# Patient Record
Sex: MALE | Race: WHITE | NOT HISPANIC OR LATINO
[De-identification: names, ages, dates, MRNs, and addresses within clinical notes are randomized per-mention and may not be internally consistent; named-entity substitution may affect disease eponyms.]

---

## 2020-02-18 ENCOUNTER — NON-APPOINTMENT (OUTPATIENT)
Age: 60
End: 2020-02-18

## 2020-02-18 ENCOUNTER — APPOINTMENT (OUTPATIENT)
Dept: CARDIOLOGY | Facility: CLINIC | Age: 60
End: 2020-02-18
Payer: COMMERCIAL

## 2020-02-18 VITALS
WEIGHT: 195 LBS | HEIGHT: 67 IN | HEART RATE: 126 BPM | OXYGEN SATURATION: 94 % | DIASTOLIC BLOOD PRESSURE: 90 MMHG | BODY MASS INDEX: 30.61 KG/M2 | SYSTOLIC BLOOD PRESSURE: 130 MMHG

## 2020-02-18 DIAGNOSIS — Z86.39 PERSONAL HISTORY OF OTHER ENDOCRINE, NUTRITIONAL AND METABOLIC DISEASE: ICD-10-CM

## 2020-02-18 DIAGNOSIS — Z87.898 PERSONAL HISTORY OF OTHER SPECIFIED CONDITIONS: ICD-10-CM

## 2020-02-18 DIAGNOSIS — Z80.3 FAMILY HISTORY OF MALIGNANT NEOPLASM OF BREAST: ICD-10-CM

## 2020-02-18 DIAGNOSIS — Z80.8 FAMILY HISTORY OF MALIGNANT NEOPLASM OF OTHER ORGANS OR SYSTEMS: ICD-10-CM

## 2020-02-18 DIAGNOSIS — Z82.49 FAMILY HISTORY OF ISCHEMIC HEART DISEASE AND OTHER DISEASES OF THE CIRCULATORY SYSTEM: ICD-10-CM

## 2020-02-18 DIAGNOSIS — Z78.9 OTHER SPECIFIED HEALTH STATUS: ICD-10-CM

## 2020-02-18 PROCEDURE — 93000 ELECTROCARDIOGRAM COMPLETE: CPT

## 2020-02-18 PROCEDURE — 0296T: CPT

## 2020-02-18 PROCEDURE — 99244 OFF/OP CNSLTJ NEW/EST MOD 40: CPT | Mod: 25

## 2020-02-18 NOTE — PHYSICAL EXAM
[Normal Appearance] : normal appearance [General Appearance - Well Developed] : well developed [General Appearance - Well Nourished] : well nourished [Well Groomed] : well groomed [General Appearance - In No Acute Distress] : no acute distress [Normal Conjunctiva] : the conjunctiva exhibited no abnormalities [No Deformities] : no deformities [Eyelids - No Xanthelasma] : the eyelids demonstrated no xanthelasmas [Normal Oral Mucosa] : normal oral mucosa [No Oral Pallor] : no oral pallor [Normal] : normal [No Oral Cyanosis] : no oral cyanosis [Tachycardia] : tachycardic [No Precordial Heave] : no precordial heave was noted [Normal S1] : normal S1 [Rhythm Regular] : regular [Normal S2] : normal S2 [No Gallop] : no gallop heard [S3] : no S3 [No Murmur] : no murmurs heard [S4] : no S4 [Right Carotid Bruit] : no bruit heard over the right carotid [Left Carotid Bruit] : no bruit heard over the left carotid [Right Femoral Bruit] : no bruit heard over the right femoral artery [Left Femoral Bruit] : no bruit heard over the left femoral artery [No Abnormalities] : the abdominal aorta was not enlarged and no bruit was heard [No Pitting Edema] : no pitting edema present [2+] : left 2+ [Exaggerated Use Of Accessory Muscles For Inspiration] : no accessory muscle use [Respiration, Rhythm And Depth] : normal respiratory rhythm and effort [Abdomen Soft] : soft [Auscultation Breath Sounds / Voice Sounds] : lungs were clear to auscultation bilaterally [Abdomen Tenderness] : non-tender [Abnormal Walk] : normal gait [Abdomen Mass (___ Cm)] : no abdominal mass palpated [Cyanosis, Localized] : no localized cyanosis [Nail Clubbing] : no clubbing of the fingernails [Gait - Sufficient For Exercise Testing] : the gait was sufficient for exercise testing [Petechial Hemorrhages (___cm)] : no petechial hemorrhages [No Venous Stasis] : no venous stasis [Skin Color & Pigmentation] : normal skin color and pigmentation [] : no rash [No Xanthoma] : no  xanthoma was observed [No Skin Ulcers] : no skin ulcer [Skin Lesions] : no skin lesions [Oriented To Time, Place, And Person] : oriented to person, place, and time [Affect] : the affect was normal [No Anxiety] : not feeling anxious [Mood] : the mood was normal

## 2020-02-18 NOTE — ASSESSMENT
[FreeTextEntry1] : Reviewed on February 18, 2020\par EKG.  February 18, 2020 per indication tachycardia per interpretation.  Sinus tachycardia.  Biatrial enlargement.  Normal intervals.\par Labs 2016 had shown stable CBC CMP.  Most recent labs not available.  He is planning to have more labs done tomorrow.\par Specifically in relation to thyroid abnormality.

## 2020-02-18 NOTE — REASON FOR VISIT
[Consultation] : a consultation regarding [Abnormal ECG] : an abnormal ECG [Palpitations] : palpitations [FreeTextEntry1] : 59-year-old gentleman comes in for consultation referred by primary care physician and endocrinologist in relation to\par Sinus tachycardia intermittently since 1997 now more pronounced and remaining greater than 110 on multiple occasions.  He has noticed intermittent palpitations.  Without any associated chest pain PND orthopnea pedal edema.  He has no dizziness lightheadedness or syncope.  When he has used his wife's apple watch he has noticed heart rate at night is around 80.  Though during the daytime it can be as high as 120 or higher.\par Thyroid disorder initially hypothyroidism on levothyroxine subsequently hypothyroidism now on methimazole with intermittent changes being followed with endocrinologist closely.\par Renal stone disease\par No significant history of hypertension, diabetes mellitus, myocardial infarction, coronary artery disease, cerebrovascular accident, peripheral arterial disease, rheumatic fever.  He has no history of syncope.\par Has stable weight\par Good activity level though limited by arthritis of shoulder and bilateral knee\par No significant increase in alcohol or caffeine intake.  1-2 drinks of caffeinated beverage a day.  1 drink of alcohol intermittently.\par No significant sleep disturbances or diagnosis of sleep apnea though has been noted to snore according to his wife\par Denies any over-the-counter stimulants to use\par

## 2020-02-18 NOTE — REVIEW OF SYSTEMS
[see HPI] : see HPI [Shortness Of Breath] : no shortness of breath [Chest  Pressure] : no chest pressure [Dyspnea on exertion] : not dyspnea during exertion [Chest Pain] : no chest pain [Lower Ext Edema] : no extremity edema [Leg Claudication] : no intermittent leg claudication [Palpitations] : palpitations [Negative] : Heme/Lymph

## 2020-02-18 NOTE — DISCUSSION/SUMMARY
[FreeTextEntry1] : 59-year-old gentleman with thyroid disorder at present being treated for hypothyroidism comes in with intermittent palpitation, fast heartbeats and EKG showing sinus tachycardia with biatrial enlargement.\par Clinically no signs of congestive heart failure.  He does have good activity and exercise program limited by osteoarthritis.\par He has stable weight\par I do not have most recent thyroid and other labs available.\par I do not have any lipid panel available.\par His blood pressure today was borderline.\par \par At present to rule out any significant structural heart disease and presence of longstanding history of sinus tachycardia/thyroid disorder I have recommended him\par Echocardiogram for LV ejection fraction wall motion wall thickness pericardial space evaluation\par Exercise treadmill stress test to assess evaluate his exercise tolerance and blood pressure heart rate response with exercise\par I have also given him 24-hour cardiac monitoring to assess evaluate his diurnal variation of ventricular rate.\par He will also have fasting lipid panel along with other labs which are to be done with endocrinologist office specifically involving CBC CMP thyroid panel.\par Recommended to avoid stimulants.  Lower level of alcohol and caffeine intake\par \par Based on above test will discuss further whether he would benefit from any other medical evaluation management and or medications like beta-blocker use.\par \par He does have erectile dysfunction and was prescribed Cialis.  At present I have recommended him to not use until cardiac evaluation is complete.  With vasodilator property it can increase ventricular rate further.\par If there is no structural heart disease consider shorter acting medications like Levitra or sildenafil.\par \par Counseling regarding low saturated fat, salt and carbohydrate intake was reviewed. Active lifestyle and regular. Exercise along with weight management is advised.\par All the above were at length reviewed. Answered all the questions. Thank you very much for this kind referral. Please do not hesitate to give me a call for any question.\par Part of this transcription was done with voice recognition software and phonetically similar errors are common. I apologize for that. Please do not hesitate to call for any questions due to above.\par

## 2020-02-19 ENCOUNTER — TRANSCRIPTION ENCOUNTER (OUTPATIENT)
Age: 60
End: 2020-02-19

## 2020-02-26 PROCEDURE — 0298T: CPT

## 2020-02-27 ENCOUNTER — APPOINTMENT (OUTPATIENT)
Dept: CARDIOLOGY | Facility: CLINIC | Age: 60
End: 2020-02-27
Payer: COMMERCIAL

## 2020-02-27 PROCEDURE — 99214 OFFICE O/P EST MOD 30 MIN: CPT

## 2020-02-27 PROCEDURE — 93306 TTE W/DOPPLER COMPLETE: CPT

## 2020-02-27 NOTE — DISCUSSION/SUMMARY
[FreeTextEntry1] : FIOR SALINAS  is a 59 year M  who presents today Feb 27, 2020 with the above history and the following active issues. \par \par Cardiomyopathy with EF 35%. Recommend starting Toprol 25mg and Valsartan 80mg QD. Lab work including CBC, CMP, RF, serum light chain, SRIKANTH, CRP, Lyme titer, ACE level, TSH. \par Ischemic evaluation with left heart cath. Risks, benefits and alternatives reviewed. \par \par Thyroid disorder followed by endocrinology.\par \par Sinus tachycardia. Start Toprol 25mg QD. Recommended to avoid stimulants.  Lower level of alcohol and caffeine intake. Blood work to be obtained today. \par \par Red flag symptoms which would warrant sooner emergent evaluation reviewed with the patient. \par Questions and concerns were addressed and answered. \par \par Sincerely,\par \par Kimberly Moore PA-C\par Patients history, testing and plan reviewed with supervising MD: Dr. Patrick Valentino \par Reviewed over the phone with Dr. Oconnell

## 2020-02-27 NOTE — REVIEW OF SYSTEMS
[see HPI] : see HPI [Negative] : Heme/Lymph [Shortness Of Breath] : no shortness of breath [Chest  Pressure] : no chest pressure [Dyspnea on exertion] : not dyspnea during exertion [Lower Ext Edema] : no extremity edema [Chest Pain] : no chest pain [Palpitations] : no palpitations [Leg Claudication] : no intermittent leg claudication

## 2020-02-27 NOTE — PHYSICAL EXAM
[General Appearance - Well Developed] : well developed [Well Groomed] : well groomed [Normal Appearance] : normal appearance [General Appearance - Well Nourished] : well nourished [No Deformities] : no deformities [General Appearance - In No Acute Distress] : no acute distress [Normal Conjunctiva] : the conjunctiva exhibited no abnormalities [Eyelids - No Xanthelasma] : the eyelids demonstrated no xanthelasmas [Normal Oral Mucosa] : normal oral mucosa [No Oral Pallor] : no oral pallor [No Oral Cyanosis] : no oral cyanosis [Exaggerated Use Of Accessory Muscles For Inspiration] : no accessory muscle use [Respiration, Rhythm And Depth] : normal respiratory rhythm and effort [Auscultation Breath Sounds / Voice Sounds] : lungs were clear to auscultation bilaterally [Abdomen Soft] : soft [Abdomen Tenderness] : non-tender [Abdomen Mass (___ Cm)] : no abdominal mass palpated [Abnormal Walk] : normal gait [Gait - Sufficient For Exercise Testing] : the gait was sufficient for exercise testing [Nail Clubbing] : no clubbing of the fingernails [Petechial Hemorrhages (___cm)] : no petechial hemorrhages [Cyanosis, Localized] : no localized cyanosis [Skin Color & Pigmentation] : normal skin color and pigmentation [] : no rash [No Venous Stasis] : no venous stasis [Skin Lesions] : no skin lesions [No Xanthoma] : no  xanthoma was observed [No Skin Ulcers] : no skin ulcer [Oriented To Time, Place, And Person] : oriented to person, place, and time [Affect] : the affect was normal [Mood] : the mood was normal [No Anxiety] : not feeling anxious [Normal] : normal [No Precordial Heave] : no precordial heave was noted [Tachycardia] : tachycardic [Normal S1] : normal S1 [Rhythm Regular] : regular [Normal S2] : normal S2 [No Gallop] : no gallop heard [No Murmur] : no murmurs heard [2+] : right 2+ [No Pitting Edema] : no pitting edema present [No Abnormalities] : the abdominal aorta was not enlarged and no bruit was heard [S4] : no S4 [S3] : no S3 [Right Femoral Bruit] : no bruit heard over the right femoral artery [Right Carotid Bruit] : no bruit heard over the right carotid [Left Femoral Bruit] : no bruit heard over the left femoral artery [Left Carotid Bruit] : no bruit heard over the left carotid

## 2020-02-27 NOTE — REASON FOR VISIT
[Consultation] : a consultation regarding [Abnormal ECG] : an abnormal ECG [Palpitations] : palpitations [FreeTextEntry1] : FIOR SALINAS  is a 59 year M  who presents today Feb 27, 2020 for echocardiogram and ETT. Echo demonstrated EF 35%. Patient has history of sinus tachycardia on HM average HR 110bpm. He is active on a regular basis with no new exertional complaints. \par \par Initially referred by primary care physician and endocrinologist in relation to sinus tachycardia intermittently since 1997 now more pronounced and remaining greater than 110 on multiple occasions.  \par \par Thyroid disorder initially hypothyroidism on levothyroxine subsequently hypothyroidism now on methimazole with intermittent changes being followed with endocrinologist closely.\par Renal stone disease\par No significant history of hypertension, diabetes mellitus, myocardial infarction, coronary artery disease, cerebrovascular accident, peripheral arterial disease, rheumatic fever.  He has no history of syncope.\par Has stable weight\par Good activity level though limited by arthritis of shoulder and bilateral knee\par No significant increase in alcohol or caffeine intake.  1-2 drinks of caffeinated beverage a day.  1 drink of alcohol intermittently.\par No significant sleep disturbances or diagnosis of sleep apnea though has been noted to snore according to his wife\par Denies any over-the-counter stimulants to use\par

## 2020-02-27 NOTE — ASSESSMENT
[FreeTextEntry1] : Reviewed on February 18, 2020\par EKG.  February 18, 2020 per indication tachycardia per interpretation.  Sinus tachycardia.  Biatrial enlargement.  Normal intervals.\par

## 2020-02-28 ENCOUNTER — OUTPATIENT (OUTPATIENT)
Dept: OUTPATIENT SERVICES | Facility: HOSPITAL | Age: 60
LOS: 1 days | End: 2020-02-28
Payer: COMMERCIAL

## 2020-02-28 PROCEDURE — 99205 OFFICE O/P NEW HI 60 MIN: CPT | Mod: 25

## 2020-02-28 PROCEDURE — 93460 R&L HRT ART/VENTRICLE ANGIO: CPT | Mod: 26

## 2020-02-28 PROCEDURE — 99214 OFFICE O/P EST MOD 30 MIN: CPT | Mod: 25

## 2020-03-02 ENCOUNTER — APPOINTMENT (OUTPATIENT)
Dept: CARDIOLOGY | Facility: CLINIC | Age: 60
End: 2020-03-02
Payer: COMMERCIAL

## 2020-03-02 VITALS
SYSTOLIC BLOOD PRESSURE: 118 MMHG | HEIGHT: 67 IN | OXYGEN SATURATION: 93 % | BODY MASS INDEX: 29.82 KG/M2 | WEIGHT: 190 LBS | DIASTOLIC BLOOD PRESSURE: 72 MMHG | HEART RATE: 98 BPM

## 2020-03-02 PROCEDURE — 99215 OFFICE O/P EST HI 40 MIN: CPT

## 2020-03-02 RX ORDER — METOPROLOL SUCCINATE 25 MG/1
25 TABLET, EXTENDED RELEASE ORAL DAILY
Qty: 90 | Refills: 3 | Status: DISCONTINUED | COMMUNITY
Start: 2020-02-27 | End: 2020-03-02

## 2020-03-02 NOTE — PHYSICAL EXAM
[General Appearance - Well Developed] : well developed [Normal Appearance] : normal appearance [General Appearance - Well Nourished] : well nourished [Well Groomed] : well groomed [General Appearance - In No Acute Distress] : no acute distress [No Deformities] : no deformities [Eyelids - No Xanthelasma] : the eyelids demonstrated no xanthelasmas [Normal Conjunctiva] : the conjunctiva exhibited no abnormalities [Normal Oral Mucosa] : normal oral mucosa [No Oral Pallor] : no oral pallor [No Oral Cyanosis] : no oral cyanosis [Respiration, Rhythm And Depth] : normal respiratory rhythm and effort [Auscultation Breath Sounds / Voice Sounds] : lungs were clear to auscultation bilaterally [Exaggerated Use Of Accessory Muscles For Inspiration] : no accessory muscle use [Abdomen Soft] : soft [Abdomen Tenderness] : non-tender [Abdomen Mass (___ Cm)] : no abdominal mass palpated [Gait - Sufficient For Exercise Testing] : the gait was sufficient for exercise testing [Abnormal Walk] : normal gait [Cyanosis, Localized] : no localized cyanosis [Nail Clubbing] : no clubbing of the fingernails [Petechial Hemorrhages (___cm)] : no petechial hemorrhages [FreeTextEntry1] : Right groin site without any hematoma, no bruit or pulsation.  Good distal pulses. [Skin Color & Pigmentation] : normal skin color and pigmentation [] : no rash [No Venous Stasis] : no venous stasis [Skin Lesions] : no skin lesions [No Xanthoma] : no  xanthoma was observed [No Skin Ulcers] : no skin ulcer [Oriented To Time, Place, And Person] : oriented to person, place, and time [Mood] : the mood was normal [No Anxiety] : not feeling anxious [Affect] : the affect was normal [No Precordial Heave] : no precordial heave was noted [Normal] : normal [Tachycardia] : tachycardic [Rhythm Regular] : regular [Normal S1] : normal S1 [No Gallop] : no gallop heard [Normal S2] : normal S2 [S4] : no S4 [S3] : no S3 [No Murmur] : no murmurs heard [Right Carotid Bruit] : no bruit heard over the right carotid [Left Carotid Bruit] : no bruit heard over the left carotid [Left Femoral Bruit] : no bruit heard over the left femoral artery [Right Femoral Bruit] : no bruit heard over the right femoral artery [2+] : left 2+ [No Pitting Edema] : no pitting edema present [No Abnormalities] : the abdominal aorta was not enlarged and no bruit was heard

## 2020-03-02 NOTE — REASON FOR VISIT
[Abnormal ECG] : an abnormal ECG [Consultation] : a consultation regarding [Palpitations] : palpitations [FreeTextEntry1] : FIOR SALINAS  is a 59 year M  who presents today  for echocardiogram and ETT. Echo demonstrated EF 35%. Patient has history of sinus tachycardia on HM average HR 110bpm.  Coronary angiography showed luminal irregularities.  Normal cardiac output and normal pulmonary wedge pressure.  LV ejection fraction around 45% on LV gram.  His labs which are available so far include CBC which was normal.  PT PTT was normal.  CMP with sodium 139 potassium 4.1 creatinine 0.8.  LFT normal.  Total cholesterol 174 HDL 52 .  TSH 2.80.  C-reactive protein 0.18.  SRIKANTH RA factor negative.  Light chains lambda normal.  1.99 mildly increased.  Ratio normal.  Serum osmolality normal.\par Lyme titers are pending.\par His metoprolol was increased to 50 mg.  He has been tolerating his valsartan well at 80 mg dosage.\par \par I had a lengthy discussion with  and wife regarding pathophysiology of cardiomyopathy.  He is overall class I functional status.  Unclear etiology.  Possibility of tachycardia induced cardiomyopathy cannot be ruled out.\par So far no significant obstructive coronary artery disease\par Tachycardia improved but still elevated\par He has social alcohol intake.  Denies any increased caffeine intake\par He is an avid bicyclist.\par He has clinically no signs of congestive heart failure.\par \par Labs which includes mild abnormality of light chains noted.\par Iron studies Lyme titers not available yet.\par \par Increase valsartan to 160 mg daily\par We will repeat basic metabolic panel and light chains along with SPEP.\par He will have iron studies serum ferritin level\par \par Further evaluation of cardiomyopathy for structural and morphological abnormality of myocardium he will have cardiac MRI with and without contrast.\par This is important to further analyze his moderatly reduced LV systolic function without any significant coronary artery disease.\par Based on above tests we will consider\par Changing metoprolol to carvedilol\par Changing valsartan to Entresto\par Consider addition of Spironolactone if there is evidence of significant fibrosis\par Further discussion regarding evaluation by cardiomyopathy/CHF specialist\par May need to be seen by electrophysiologist to discuss device management if LV ejection fraction does not improve with improving tachycardia.\par \par Avoiding alcohol recommended\par Low-salt diet decrease in saturated fat carbohydrate intake\par Avoiding heavy lifting, aggressive exercises till above evaluation is complete\par He is recommended not to have 50 mile bike ride as planned till evaluation is complete.  And he is on appropriate medication with improved LV ejection fraction.\par \par High risk symptoms have been discussed with them to notify us or call 911\par \par Counseling regarding low saturated fat, salt and carbohydrate intake was reviewed. Active lifestyle and regular. Exercise along with weight management is advised.\par All the above were at length reviewed. Answered all the questions. Thank you very much for this kind referral. Please do not hesitate to give me a call for any question.\par Part of this transcription was done with voice recognition software and phonetically similar errors are common. I apologize for that. Please do not hesitate to call for any questions due to above.\par

## 2020-03-02 NOTE — REVIEW OF SYSTEMS
[see HPI] : see HPI [Dyspnea on exertion] : not dyspnea during exertion [Shortness Of Breath] : no shortness of breath [Chest Pain] : no chest pain [Chest  Pressure] : no chest pressure [Lower Ext Edema] : no extremity edema [Palpitations] : no palpitations [Leg Claudication] : no intermittent leg claudication [Negative] : Endocrine

## 2020-03-03 ENCOUNTER — APPOINTMENT (OUTPATIENT)
Dept: CARDIOLOGY | Facility: CLINIC | Age: 60
End: 2020-03-03
Payer: COMMERCIAL

## 2020-03-03 VITALS
WEIGHT: 190 LBS | HEART RATE: 55 BPM | OXYGEN SATURATION: 98 % | HEIGHT: 67 IN | BODY MASS INDEX: 29.82 KG/M2 | DIASTOLIC BLOOD PRESSURE: 78 MMHG | SYSTOLIC BLOOD PRESSURE: 138 MMHG

## 2020-03-03 DIAGNOSIS — Z00.00 ENCOUNTER FOR GENERAL ADULT MEDICAL EXAMINATION W/OUT ABNORMAL FINDINGS: ICD-10-CM

## 2020-03-03 PROCEDURE — 99215 OFFICE O/P EST HI 40 MIN: CPT

## 2020-03-03 NOTE — PHYSICAL EXAM
[Normal Appearance] : normal appearance [General Appearance - Well Developed] : well developed [Well Groomed] : well groomed [General Appearance - Well Nourished] : well nourished [No Deformities] : no deformities [General Appearance - In No Acute Distress] : no acute distress [Eyelids - No Xanthelasma] : the eyelids demonstrated no xanthelasmas [Normal Conjunctiva] : the conjunctiva exhibited no abnormalities [Normal Oral Mucosa] : normal oral mucosa [No Oral Pallor] : no oral pallor [No Oral Cyanosis] : no oral cyanosis [Normal Jugular Venous A Waves Present] : normal jugular venous A waves present [Normal Jugular Venous V Waves Present] : normal jugular venous V waves present [No Jugular Venous Whalen A Waves] : no jugular venous whalen A waves [Respiration, Rhythm And Depth] : normal respiratory rhythm and effort [Exaggerated Use Of Accessory Muscles For Inspiration] : no accessory muscle use [Auscultation Breath Sounds / Voice Sounds] : lungs were clear to auscultation bilaterally [Heart Rate And Rhythm] : heart rate and rhythm were normal [Heart Sounds] : normal S1 and S2 [Murmurs] : no murmurs present [Abdomen Soft] : soft [Abdomen Tenderness] : non-tender [Abdomen Mass (___ Cm)] : no abdominal mass palpated [Abnormal Walk] : normal gait [Gait - Sufficient For Exercise Testing] : the gait was sufficient for exercise testing [Nail Clubbing] : no clubbing of the fingernails [Cyanosis, Localized] : no localized cyanosis [Petechial Hemorrhages (___cm)] : no petechial hemorrhages [Skin Color & Pigmentation] : normal skin color and pigmentation [] : no rash [No Venous Stasis] : no venous stasis [Skin Lesions] : no skin lesions [No Skin Ulcers] : no skin ulcer [No Xanthoma] : no  xanthoma was observed [Oriented To Time, Place, And Person] : oriented to person, place, and time [Affect] : the affect was normal [Mood] : the mood was normal [No Anxiety] : not feeling anxious

## 2020-03-03 NOTE — REASON FOR VISIT
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  His coronary arteries were normal and his hemodynamics were normal.  I started him on a beta-blocker to reduce his tachycardia.

## 2020-03-09 ENCOUNTER — FORM ENCOUNTER (OUTPATIENT)
Age: 60
End: 2020-03-09

## 2020-03-10 ENCOUNTER — OUTPATIENT (OUTPATIENT)
Dept: OUTPATIENT SERVICES | Facility: HOSPITAL | Age: 60
LOS: 1 days | End: 2020-03-10
Payer: COMMERCIAL

## 2020-03-10 ENCOUNTER — APPOINTMENT (OUTPATIENT)
Dept: MRI IMAGING | Facility: CLINIC | Age: 60
End: 2020-03-10
Payer: COMMERCIAL

## 2020-03-10 DIAGNOSIS — I42.9 CARDIOMYOPATHY, UNSPECIFIED: ICD-10-CM

## 2020-03-10 PROCEDURE — 75561 CARDIAC MRI FOR MORPH W/DYE: CPT | Mod: 26

## 2020-03-10 PROCEDURE — 75561 CARDIAC MRI FOR MORPH W/DYE: CPT

## 2020-03-19 ENCOUNTER — APPOINTMENT (OUTPATIENT)
Dept: CARDIOLOGY | Facility: CLINIC | Age: 60
End: 2020-03-19
Payer: COMMERCIAL

## 2020-03-19 VITALS
SYSTOLIC BLOOD PRESSURE: 120 MMHG | BODY MASS INDEX: 29.82 KG/M2 | DIASTOLIC BLOOD PRESSURE: 80 MMHG | OXYGEN SATURATION: 98 % | HEIGHT: 67 IN | WEIGHT: 190 LBS | HEART RATE: 102 BPM

## 2020-03-19 DIAGNOSIS — F52.21 MALE ERECTILE DISORDER: ICD-10-CM

## 2020-03-19 PROCEDURE — 99215 OFFICE O/P EST HI 40 MIN: CPT

## 2020-03-19 RX ORDER — METOPROLOL SUCCINATE 50 MG/1
50 TABLET, EXTENDED RELEASE ORAL DAILY
Refills: 0 | Status: DISCONTINUED | COMMUNITY
End: 2020-03-19

## 2020-03-19 NOTE — PHYSICAL EXAM
[General Appearance - Well Developed] : well developed [Normal Appearance] : normal appearance [Well Groomed] : well groomed [General Appearance - Well Nourished] : well nourished [No Deformities] : no deformities [General Appearance - In No Acute Distress] : no acute distress [Normal Conjunctiva] : the conjunctiva exhibited no abnormalities [Eyelids - No Xanthelasma] : the eyelids demonstrated no xanthelasmas [Normal Oral Mucosa] : normal oral mucosa [No Oral Pallor] : no oral pallor [No Oral Cyanosis] : no oral cyanosis [Respiration, Rhythm And Depth] : normal respiratory rhythm and effort [Exaggerated Use Of Accessory Muscles For Inspiration] : no accessory muscle use [Auscultation Breath Sounds / Voice Sounds] : lungs were clear to auscultation bilaterally [Abdomen Soft] : soft [Abdomen Tenderness] : non-tender [Abdomen Mass (___ Cm)] : no abdominal mass palpated [Abnormal Walk] : normal gait [Gait - Sufficient For Exercise Testing] : the gait was sufficient for exercise testing [Nail Clubbing] : no clubbing of the fingernails [Cyanosis, Localized] : no localized cyanosis [Petechial Hemorrhages (___cm)] : no petechial hemorrhages [FreeTextEntry1] : Right groin site without any hematoma, no bruit or pulsation.  Good distal pulses. [Skin Color & Pigmentation] : normal skin color and pigmentation [] : no rash [No Venous Stasis] : no venous stasis [Skin Lesions] : no skin lesions [No Skin Ulcers] : no skin ulcer [No Xanthoma] : no  xanthoma was observed [Oriented To Time, Place, And Person] : oriented to person, place, and time [Affect] : the affect was normal [Mood] : the mood was normal [No Anxiety] : not feeling anxious [Normal] : normal [No Precordial Heave] : no precordial heave was noted [Tachycardia] : tachycardic [Rhythm Regular] : regular [Normal S1] : normal S1 [Normal S2] : normal S2 [No Gallop] : no gallop heard [S3] : no S3 [S4] : no S4 [No Murmur] : no murmurs heard [Right Carotid Bruit] : no bruit heard over the right carotid [Left Carotid Bruit] : no bruit heard over the left carotid [Right Femoral Bruit] : no bruit heard over the right femoral artery [Left Femoral Bruit] : no bruit heard over the left femoral artery [2+] : left 2+ [No Abnormalities] : the abdominal aorta was not enlarged and no bruit was heard [No Pitting Edema] : no pitting edema present

## 2020-03-19 NOTE — DISCUSSION/SUMMARY
[FreeTextEntry1] : FIOR SALINAS  is a 59 year M  who presents today with the above history and the following active issues. \par \par Cardiomyopathy .  Minimal irregularities.  Normal hemodynamics with normal cardiac output and normal pulmonary capillary wedge pressure.  LV gram EF 45%.  Echocardiogram LV ejection fraction less than 35% with left ventricular enlargement and mild mitral regurgitation.  Cardiac MRI 27% with left ventricular enlargement.  No late gadolinium enhancement.  No infiltrative process.  No significant thickening.  No pericardial disease.\par Possible tachycardia induced cardiomyopathy in presence of hyperthyroidism cannot be ruled out.  Also considering now diagnosis of sister having cardiomyopathy possibility of familial cardiomyopathy cannot be ruled out. \par  Recommended continue aggressive management of hypothyroidism with his endocrinologist\par We will change metoprolol to carvedilol which is nonselective beta-blockers in presence of complaint of Raynaud's type of symptoms, hypothyroidism and severely reduced LV ejection fraction/LV dilatation.\par Continue valsartan 160 mg as long as labs are stable would recommend to change it to Entresto 49/51 twice daily as tolerated with careful follow-up on labs specifically potassium BUN/creatinine.\par Continue abstinence from alcohol, low-salt diet\par Gradually increasing exercises but avoid heavy lifting\par Follow-up with cardiomyopathy specialist recommended\par In spite of optimal medical therapy if persistent lower LV ejection fraction may need to discuss device therapy with electrophysiologist though if he remains class I the risk benefit may not favor device therapy.  We will let electrophysiologist decide at that time.\par Labs which were ordered again are recommended to be completed.\par \par Thyroid disorder followed by endocrinology.\par \par Erectile dysfunction.  Viagra after reviewing risk and benefits have been prescribed.  They do understand associated risk.  Verbalized understanding.\par \par Counseling regarding low saturated fat, salt and carbohydrate intake was reviewed. Active lifestyle and regular. Exercise along with weight management is advised.\par All the above were at length reviewed. Answered all the questions. Thank you very much for this kind referral. Please do not hesitate to give me a call for any question.\par Part of this transcription was done with voice recognition software and phonetically similar errors are common. I apologize for that. Please do not hesitate to call for any questions due to above.\par \par Follow-up in 2 to 3 weeks

## 2020-03-19 NOTE — REVIEW OF SYSTEMS
[Shortness Of Breath] : no shortness of breath [Dyspnea on exertion] : not dyspnea during exertion [Chest  Pressure] : no chest pressure [Chest Pain] : no chest pain [Lower Ext Edema] : no extremity edema [Leg Claudication] : no intermittent leg claudication [Palpitations] : no palpitations [see HPI] : see HPI [Urinary Frequency] : no change in urinary frequency [Hematuria] : no hematuria [Pain During Urination] : no dysuria [Impotence] : impotence [Nocturia] : no nocturia [Negative] : Heme/Lymph

## 2020-03-19 NOTE — REASON FOR VISIT
[Follow-Up - Clinic] : a clinic follow-up of [Abnormal ECG] : an abnormal ECG [Palpitations] : palpitations [FreeTextEntry1] : FIOR SALINAS  is a 59 year M  who presents today  for echocardiogram and ETT. Echo demonstrated EF 35%. Patient has history of sinus tachycardia on HM average HR 110bpm.  Coronary angiography showed luminal irregularities.  Normal cardiac output and normal pulmonary wedge pressure.  LV ejection fraction around 45% on LV gram.  His labs which are available so far include CBC which was normal.  PT PTT was normal.  CMP with sodium 139 potassium 4.1 creatinine 0.8.  LFT normal.  Total cholesterol 174 HDL 52 .  TSH 2.80.  C-reactive protein 0.18.  SRIKANTH RA factor negative.  Light chains lambda normal.  1.99 mildly increased.  Ratio normal.  Serum osmolality normal.\par His metoprolol was increased to 75 mg.  And his valsartan was increased 160 mg.\par He is being treated for hyperthyroidism and has tachycardia for the.  Time which is improving gradually.\par He is being followed with endocrinologist in relation to his hyperthyroidism.  He has been on methimazole.\par He has stable diet.  No significant weight gain\par Continue to have class I functional status.  Though he has been doing less exercise now.  In the past he was doing 50 mile bike ride, regular aggressive aerobic and weightlifting exercises.  Since last seen it appears his sister\par Had cardiac event.  Had a cardiac MRI which showed nonischemic cardiomyopathy.\par He he did not have his repeat labs as advised\par He is here to review his cardiac MRI and optimization of the medications\par There is no significant change in his symptoms.  According to him he has no significant side effects of the medication and has no other symptoms.\par He does have mild erectile dysfunction and presence of this medications.\par So far no significant obstructive coronary artery disease\par Tachycardia improved but still higher heart rate\par He has social alcohol intake.  Denies any increased caffeine intake\par \par  [Spouse] : spouse

## 2020-03-19 NOTE — ASSESSMENT
[FreeTextEntry1] : Reviewed on February 18, 2020\par EKG.  February 18, 2020 per indication tachycardia per interpretation.  Sinus tachycardia.  Biatrial enlargement.  Normal intervals.\par \par

## 2020-03-19 NOTE — HISTORY OF PRESENT ILLNESS
[FreeTextEntry1] : HPI\par Dilated cardiomyopathy normal coronary arteries nonischemic\par Hypothyroidism\par Sinus tachycardia\par

## 2020-03-19 NOTE — CARDIOLOGY SUMMARY
[LVEF ___%] : LVEF [unfilled]% [Severe] : severe LV dysfunction [None] : no pulmonary hypertension [Enlarged] : enlarged LA size [Mild] : mild mitral regurgitation [___] : [unfilled]

## 2020-04-01 ENCOUNTER — APPOINTMENT (OUTPATIENT)
Dept: CARDIOLOGY | Facility: CLINIC | Age: 60
End: 2020-04-01
Payer: COMMERCIAL

## 2020-04-01 VITALS
DIASTOLIC BLOOD PRESSURE: 68 MMHG | SYSTOLIC BLOOD PRESSURE: 118 MMHG | BODY MASS INDEX: 31.71 KG/M2 | OXYGEN SATURATION: 96 % | HEIGHT: 67 IN | WEIGHT: 202 LBS | HEART RATE: 87 BPM

## 2020-04-01 PROCEDURE — 99214 OFFICE O/P EST MOD 30 MIN: CPT

## 2020-04-01 PROCEDURE — 0296T: CPT

## 2020-04-01 RX ORDER — VALSARTAN 80 MG/1
80 TABLET, COATED ORAL DAILY
Qty: 180 | Refills: 1 | Status: DISCONTINUED | COMMUNITY
Start: 2020-02-27 | End: 2020-04-01

## 2020-04-01 NOTE — REVIEW OF SYSTEMS
[see HPI] : see HPI [Shortness Of Breath] : no shortness of breath [Dyspnea on exertion] : not dyspnea during exertion [Chest  Pressure] : no chest pressure [Chest Pain] : no chest pain [Lower Ext Edema] : no extremity edema [Leg Claudication] : no intermittent leg claudication [Palpitations] : no palpitations [Urinary Frequency] : no change in urinary frequency [Hematuria] : no hematuria [Pain During Urination] : no dysuria [Impotence] : no impotence [Nocturia] : no nocturia [Negative] : Heme/Lymph

## 2020-04-01 NOTE — REASON FOR VISIT
[Follow-Up - Clinic] : a clinic follow-up of [Abnormal ECG] : an abnormal ECG [Cardiomyopathy] : cardiomyopathy [Medication Management] : Medication management [Palpitations] : palpitations [FreeTextEntry1] : FIOR SALINAS  is a 59 year M  who presents today  for echocardiogram and ETT. Echo demonstrated EF 35%. Patient has history of sinus tachycardia on HM average HR 110bpm.  Coronary angiography showed luminal irregularities.  Normal cardiac output and normal pulmonary wedge pressure.  LV ejection fraction around 45% on LV gram.  His labs which are available so far include CBC which was normal.  PT PTT was normal.  CMP with sodium 139 potassium 4.1 creatinine 0.8.  LFT normal.  Total cholesterol 174 HDL 52 .  TSH 2.80.  C-reactive protein 0.18.  SRIKANTH RA factor negative.  Light chains lambda normal.  1.99 mildly increased.  Ratio normal.  Serum osmolality normal.\par He is being treated for hyperthyroidism and has tachycardia . which is improving gradually.\par He is being followed with endocrinologist in relation to his hyperthyroidism.  He has been on methimazole.\par His sister recently diagnosed to have nonischemic cardiomyopathy.\par He has stable diet.  No significant weight gain\par He is here to review his cardiac MRI and optimization of the medications. \par There is no significant change in his symptoms.  According to him he has no significant side effects of the medication and has no other symptoms on carvedilol 12.5 mg twice daily and valsartan 160 mg.\par Continue to have class I functional status.  No limitation of activity.  No new symptoms.\par \par \par  [Spouse] : spouse

## 2020-04-01 NOTE — ASSESSMENT
[FreeTextEntry1] : Reviewed on February 18, 2020\par EKG.  February 18, 2020 per indication tachycardia per interpretation.  Sinus tachycardia.  Biatrial enlargement.  Normal intervals.\par \par Holter monitor February 18, 2020.  No significant ventricular arrhythmias.\par Labs March 19, 2020 Potassium 4.8 creatinine 0.75 sodium 140 serum ferritin 143 SPEP negative light chains within normal range

## 2020-04-01 NOTE — DISCUSSION/SUMMARY
[FreeTextEntry1] : FIOR SALINAS  is a 59 year M  who presents today with the above history and the following active issues. \par \par Cardiomyopathy .  Minimal irregularities.  Normal hemodynamics with normal cardiac output and normal pulmonary capillary wedge pressure.  LV gram EF 45%.  Echocardiogram LV ejection fraction less than 35% with left ventricular enlargement and mild mitral regurgitation.  Cardiac MRI 27% with left ventricular enlargement.  No late gadolinium enhancement.  No infiltrative process.  No significant thickening.  No pericardial disease.\par cardiomyopathy possibility of familial cardiomyopathy cannot be ruled out. \par Dr. krishna had a telephonic consultation with him.\par His recommendation besides maximization of the medication which includes use of Entresto included longer-term cardiac monitoring to rule out any high risk ventricular arrhythmias.\par  Recommended continue aggressive management of hyperthyroidism with his endocrinologist\par Increase carvedilol to 25 mg twice daily\par Change valsartan 160 mg to Entresto 49/51 mg twice daily as long as he tolerates higher dose of carvedilol without significant side effects\par He will have basic metabolic panel within 1 week of use of Entresto and subsequently in 2 to 3 weeks.\par If any significant side effects he will contact me.\par Gradually increasing exercises but avoid heavy lifting\par Follow-up and further recommendations as per Dr. Krishna and genetic counseling.\par In spite of optimal medical therapy if persistent lower LV ejection fraction or high risk ventricular arrhythmia on event recorder may need to discuss device therapy with electrophysiologist .\par \par \par Counseling regarding low saturated fat, salt and carbohydrate intake was reviewed. Active lifestyle and regular. Exercise along with weight management is advised.\par All the above were at length reviewed. Answered all the questions. Thank you very much for this kind referral. Please do not hesitate to give me a call for any question.\par Part of this transcription was done with voice recognition software and phonetically similar errors are common. I apologize for that. Please do not hesitate to call for any questions due to above.\par \par Follow-up in 1 month

## 2020-04-01 NOTE — PHYSICAL EXAM
[General Appearance - Well Developed] : well developed [Normal Appearance] : normal appearance [Well Groomed] : well groomed [General Appearance - Well Nourished] : well nourished [No Deformities] : no deformities [General Appearance - In No Acute Distress] : no acute distress [Normal Conjunctiva] : the conjunctiva exhibited no abnormalities [Eyelids - No Xanthelasma] : the eyelids demonstrated no xanthelasmas [Normal Oral Mucosa] : normal oral mucosa [No Oral Pallor] : no oral pallor [No Oral Cyanosis] : no oral cyanosis [Respiration, Rhythm And Depth] : normal respiratory rhythm and effort [Exaggerated Use Of Accessory Muscles For Inspiration] : no accessory muscle use [Auscultation Breath Sounds / Voice Sounds] : lungs were clear to auscultation bilaterally [Heart Rate And Rhythm] : heart rate and rhythm were normal [Heart Sounds] : normal S1 and S2 [Murmurs] : no murmurs present [Arterial Pulses Normal] : the arterial pulses were normal [Edema] : no peripheral edema present [Veins - Varicosity Changes] : no varicosital changes were noted in the lower extremities [Abnormal Walk] : normal gait [Gait - Sufficient For Exercise Testing] : the gait was sufficient for exercise testing [Nail Clubbing] : no clubbing of the fingernails [Cyanosis, Localized] : no localized cyanosis [Skin Color & Pigmentation] : normal skin color and pigmentation [] : no rash [Oriented To Time, Place, And Person] : oriented to person, place, and time [Affect] : the affect was normal [Mood] : the mood was normal [No Anxiety] : not feeling anxious

## 2020-04-21 PROCEDURE — 0298T: CPT

## 2020-04-24 ENCOUNTER — APPOINTMENT (OUTPATIENT)
Dept: CARDIOLOGY | Facility: CLINIC | Age: 60
End: 2020-04-24
Payer: COMMERCIAL

## 2020-04-24 VITALS — HEIGHT: 67 IN | BODY MASS INDEX: 30.29 KG/M2 | WEIGHT: 193 LBS

## 2020-04-24 PROCEDURE — 99214 OFFICE O/P EST MOD 30 MIN: CPT | Mod: 95

## 2020-04-24 NOTE — CARDIOLOGY SUMMARY
[LVEF ___%] : LVEF [unfilled]% [Severe] : severe LV dysfunction [None] : no pulmonary hypertension [Mild] : mild mitral regurgitation [Enlarged] : enlarged LA size [___] : [unfilled]

## 2020-04-24 NOTE — PHYSICAL EXAM
[Normal Appearance] : normal appearance [Respiration, Rhythm And Depth] : normal respiratory rhythm and effort [] : no respiratory distress [General Appearance - In No Acute Distress] : no acute distress [Exaggerated Use Of Accessory Muscles For Inspiration] : no accessory muscle use [Mood] : the mood was normal [Oriented To Time, Place, And Person] : oriented to person, place, and time [Affect] : the affect was normal [No Anxiety] : not feeling anxious

## 2020-04-24 NOTE — REVIEW OF SYSTEMS
[see HPI] : see HPI [Negative] : Heme/Lymph [Shortness Of Breath] : no shortness of breath [Dyspnea on exertion] : not dyspnea during exertion [Chest  Pressure] : no chest pressure [Chest Pain] : no chest pain [Lower Ext Edema] : no extremity edema [Leg Claudication] : no intermittent leg claudication [Palpitations] : no palpitations [Hematuria] : no hematuria [Urinary Frequency] : no change in urinary frequency [Pain During Urination] : no dysuria [Nocturia] : no nocturia [Impotence] : no impotence

## 2020-04-24 NOTE — HISTORY OF PRESENT ILLNESS
[Medical Office: ([facility])___] : at the medical office located in  [Home] : at home, [unfilled] , at the time of the visit. [FreeTextEntry1] : Consent: Patient consented to virtual video visit.\par Time: A total of 25 minutes was spent in review of pertinent medical records, discussion with the patient, evaluation of the patient problem and coordination of the care plan as part of this online visit.\par \par No physical examination was performed as this visit was performed virtually with exceptions as noted below.\par  as I judged the risk of COVID-19 cross-contamination to be greater than benefit to the patient conferred by the physical examination.\par \par 59-year-old gentleman.  Was seen with his wife today to review his event monitor, labs results and appropriately maximize his cardiomyopathy medications.\par Since Entresto and higher dose of carvedilol he has not had any significant complaint of chest pain, shortness of breath, PND, orthopnea, pedal edema, palpitation dizziness lightheadedness.\par He is being able to be aggressive with his activity and exercise without any significant limitation at present\par He has been controlling his diet well\par He has been compliant with his medications\par No recent hospital admission\par No fever chills cough\par Following social distancing\par \par \par HPI\par Dilated cardiomyopathy normal coronary arteries nonischemic, family history which includes his sister having nonischemic cardiomyopathy and possibly his father\par Hyperthyroidism\par Sinus tachycardia\par

## 2020-04-24 NOTE — DISCUSSION/SUMMARY
[FreeTextEntry1] : FIOR SALINAS  is a 59 year M  who presents today with the above history and the following active issues. \par \par Cardiomyopathy .  Minimal irregularities of coronary arteries.  Normal hemodynamics with normal cardiac output and normal pulmonary capillary wedge pressure.  LV gram EF 45%.  Echocardiogram LV ejection fraction less than 35% with left ventricular enlargement and mild mitral regurgitation.  Cardiac MRI 27% with left ventricular enlargement.  No late gadolinium enhancement.  No infiltrative process.  No significant thickening.  No pericardial disease.\par cardiomyopathy possibility of familial cardiomyopathy in presence of history of sister and father having cardiomyopathy.\par Dr. krishna had a telephonic consultation with him.\par He will continue with carvedilol 25 mg twice daily\par Entresto 49/51 twice daily\par We will recheck basic metabolic panel in presence of increased creatinine and borderline potassium.\par If able to maximize Entresto I would like to do that\par If unable to maximize Entresto he will have repeat LV ejection fraction as planned in mid May.  That will be on optimal medical therapy.\par Not added Spironolactone in absence of significant fibrosis on cardiac MRI and borderline potassium level.\par Continue with low-salt diet and no alcohol\par Gradually increasing exercises but avoid heavy lifting\par Follow-up and further recommendations as per Dr. Krishna and genetic counseling when COVID-19 related emergency is over\par In spite of optimal medical therapy if persistent lower LV ejection fraction or high risk ventricular arrhythmia on event recorder may need to discuss device therapy with electrophysiologist .\par \par \par Counseling regarding low saturated fat, salt and carbohydrate intake was reviewed. Active lifestyle and regular. Exercise along with weight management is advised.\par All the above were at length reviewed. Answered all the questions. Thank you very much for this kind referral. Please do not hesitate to give me a call for any question.\par Part of this transcription was done with voice recognition software and phonetically similar errors are common. I apologize for that. Please do not hesitate to call for any questions due to above.\par \par Follow-up in 2 weeks

## 2020-04-24 NOTE — ASSESSMENT
[FreeTextEntry1] : Reviewed on February 18, 2020\par EKG.  February 18, 2020 per indication tachycardia per interpretation.  Sinus tachycardia.  Biatrial enlargement.  Normal intervals.\par \par Holter monitor February 18, 2020.  No significant ventricular arrhythmias.\par Labs March 19, 2020 Potassium 4.8 creatinine 0.75 sodium 140 serum ferritin 143 SPEP negative light chains within normal range\par \par Reviewed April 24 2020\par Labs from April 15, 2020 creatinine 1.18 potassium 5.0 sodium 138 GFR 67\par 13 days and 22 hours event monitoring showed premature atrial beats.  About 4.7% of the time.  No significant ventricular arrhythmias.  Isolated ventricular extrasystoles.  No nonsustained ventricular tachycardia.\par

## 2020-09-02 ENCOUNTER — APPOINTMENT (OUTPATIENT)
Dept: GASTROENTEROLOGY | Facility: CLINIC | Age: 60
End: 2020-09-02

## 2020-09-04 ENCOUNTER — APPOINTMENT (OUTPATIENT)
Dept: GASTROENTEROLOGY | Facility: CLINIC | Age: 60
End: 2020-09-04
Payer: COMMERCIAL

## 2020-09-04 DIAGNOSIS — Z86.010 PERSONAL HISTORY OF COLONIC POLYPS: ICD-10-CM

## 2020-09-04 PROCEDURE — 99441: CPT

## 2020-09-04 NOTE — HISTORY OF PRESENT ILLNESS
[de-identified] : 58 yo male with new hx of cardiomyopathy being see by Dr. Oconnell.  Also awaiting to have a knee replacement.  However, on phone today to schedule his routine colonoscopy, last colonoscopy was in 2017.  He otherwise feels fine, no gi issues.  No gerd, dysphagia.  No n/v, changes in bowel habits.

## 2020-09-04 NOTE — REASON FOR VISIT
[Home] : at home, [unfilled] , at the time of the visit. [Medical Office: ([facility])___] : at the medical office located in  [Verbal consent obtained from patient] : the patient, [unfilled] [Follow-Up: _____] : a [unfilled] follow-up visit

## 2020-09-04 NOTE — ASSESSMENT
[FreeTextEntry1] : 60 yo male with new hx of cardiomyopathy being see by Dr. Oconnell.  Also awaiting to have a knee replacement.  However, on phone today to schedule his routine colonoscopy, last colonoscopy was in 2017.  He otherwise feels fine, no gi issues.  No gerd, dysphagia.  No n/v, changes in bowel habits.  \par \par Plan:\par Obtain clearance from cardiology as he needs one for his knee replacement as well. \par Seeing Dr. Oconnell next week, then will schedule colon screening w/ Suprep after clearance. \par \par Discussed with Dr. Burton.

## 2020-09-08 ENCOUNTER — APPOINTMENT (OUTPATIENT)
Dept: CARDIOLOGY | Facility: CLINIC | Age: 60
End: 2020-09-08
Payer: COMMERCIAL

## 2020-09-08 ENCOUNTER — NON-APPOINTMENT (OUTPATIENT)
Age: 60
End: 2020-09-08

## 2020-09-08 VITALS
BODY MASS INDEX: 31.39 KG/M2 | WEIGHT: 200 LBS | HEART RATE: 78 BPM | HEIGHT: 67 IN | DIASTOLIC BLOOD PRESSURE: 62 MMHG | OXYGEN SATURATION: 98 % | SYSTOLIC BLOOD PRESSURE: 110 MMHG

## 2020-09-08 PROCEDURE — 99214 OFFICE O/P EST MOD 30 MIN: CPT

## 2020-09-08 PROCEDURE — 93000 ELECTROCARDIOGRAM COMPLETE: CPT

## 2020-09-08 NOTE — PHYSICAL EXAM
[General Appearance - Well Developed] : well developed [Normal Appearance] : normal appearance [Well Groomed] : well groomed [General Appearance - Well Nourished] : well nourished [No Deformities] : no deformities [General Appearance - In No Acute Distress] : no acute distress [Normal Conjunctiva] : the conjunctiva exhibited no abnormalities [Eyelids - No Xanthelasma] : the eyelids demonstrated no xanthelasmas [Normal Oral Mucosa] : normal oral mucosa [No Oral Cyanosis] : no oral cyanosis [No Oral Pallor] : no oral pallor [Normal Jugular Venous A Waves Present] : normal jugular venous A waves present [Normal Jugular Venous V Waves Present] : normal jugular venous V waves present [No Jugular Venous Whalen A Waves] : no jugular venous whalen A waves [Respiration, Rhythm And Depth] : normal respiratory rhythm and effort [Exaggerated Use Of Accessory Muscles For Inspiration] : no accessory muscle use [Auscultation Breath Sounds / Voice Sounds] : lungs were clear to auscultation bilaterally [Heart Rate And Rhythm] : heart rate and rhythm were normal [Murmurs] : no murmurs present [Heart Sounds] : normal S1 and S2 [Abdomen Soft] : soft [Abdomen Tenderness] : non-tender [Abnormal Walk] : normal gait [Abdomen Mass (___ Cm)] : no abdominal mass palpated [Gait - Sufficient For Exercise Testing] : the gait was sufficient for exercise testing [Nail Clubbing] : no clubbing of the fingernails [Cyanosis, Localized] : no localized cyanosis [Petechial Hemorrhages (___cm)] : no petechial hemorrhages [Skin Color & Pigmentation] : normal skin color and pigmentation [] : no rash [Skin Lesions] : no skin lesions [No Venous Stasis] : no venous stasis [No Skin Ulcers] : no skin ulcer [No Xanthoma] : no  xanthoma was observed [Oriented To Time, Place, And Person] : oriented to person, place, and time [Mood] : the mood was normal [Affect] : the affect was normal [No Anxiety] : not feeling anxious

## 2020-09-10 ENCOUNTER — APPOINTMENT (OUTPATIENT)
Dept: CARDIOLOGY | Facility: CLINIC | Age: 60
End: 2020-09-10
Payer: COMMERCIAL

## 2020-09-10 PROCEDURE — 93308 TTE F-UP OR LMTD: CPT

## 2020-09-14 ENCOUNTER — OUTPATIENT (OUTPATIENT)
Dept: OUTPATIENT SERVICES | Facility: HOSPITAL | Age: 60
LOS: 1 days | End: 2020-09-14

## 2020-09-14 NOTE — HISTORY OF PRESENT ILLNESS
[FreeTextEntry1] : FIOR SALINAS is a 59 year old male with a past medical history of:\par \par Dilated cardiomyopathy normal coronary arteries nonischemic, family history which includes his sister having nonischemic cardiomyopathy and possibly his father\par Hyperthyroidism\par Sinus tachycardia\par \par Last seen via televisit 4/24/20. In the interim there have been no hospitalizations or procedures. Presents today for preop clearance prior to knee replacement with Dr. Sheldon Borjas at [facility] [place] on 10/5/20. Also is going to have an upcoming colonoscopy; date TBD. He denies chest pain, pressure, palpitations, unusual shortness of breath, orthopnea, LE edema, lightheadedness, dizziness, near syncope or syncope. Activity is limited to knee pain.\par \par Testing:\par \par EKG 9/8/20: SR at 82 bpm, ND interval 146 ms, QTc 378 ms, PRWP, nonspecific ST-T wave abnormalities \par Labs 5/29/20: TSH 2.62, K 4.6, Cr 0.81, AST 22, ALT 23, .1\par \par Echo: February 27, 2020, LVEDP normal RV no pericardial effusion, severe LV dysfunction, no pulmonary hypertension, enlarged LA size, mild mitral regurgitation LVEF Less than 35%. \par \par Cardiac Cath: February 28, 2020, Luminal irregularities. LV ejection fraction 45%. Normal pulmonary capillary wedge pressure. Normal cardiac output. \par \par Cardiac MRI: March 10, 2020, LVE. 6.0 cm. Global severe hypokinesis. LV ejection fraction 27%, normal RV size and function, no asymmetric or segmental thickening of left ventricle, no resting first-pass perfusion abnormality, no convincing focus of late gadolinium enhancement, no segmental wall motion abnormality. \par \par \par Reviewed on February 18, 2020\par EKG. February 18, 2020 per indication tachycardia per interpretation. Sinus tachycardia. Biatrial enlargement. Normal intervals.\par \par Holter monitor February 18, 2020. No significant ventricular arrhythmias.\par \par Labs March 19, 2020 Potassium 4.8 creatinine 0.75 sodium 140 serum ferritin 143 SPEP negative light chains within normal range\par \par Reviewed April 24 2020\par Labs from April 15, 2020 creatinine 1.18 potassium 5.0 sodium 138 GFR 67\par 13 days and 22 hours event monitoring showed premature atrial beats. About 4.7% of the time. No significant ventricular arrhythmias. Isolated ventricular extrasystoles. No nonsustained ventricular tachycardia.\par

## 2020-09-14 NOTE — ASSESSMENT
[FreeTextEntry1] : FIOR SALINAS is a 59 year old M who presents today Sep 08, 2020 with the above history and the following active issues:\par \par Cardiomyopathy. Minimal irregularities of coronary arteries. Normal hemodynamics with normal cardiac output and normal pulmonary capillary wedge pressure. LV gram EF 45%. Echocardiogram LV ejection fraction less than 35% with left ventricular enlargement and mild mitral regurgitation. Cardiac MRI 27% with left ventricular enlargement. No late gadolinium enhancement. No infiltrative process. No significant thickening. No pericardial disease.\par cardiomyopathy possibility of familial cardiomyopathy in presence of history of sister and father having cardiomyopathy.\par \par Recommend follow up with Dr. Krishna.\par \par He will continue with carvedilol 25 mg twice daily\par Entresto 49/51 twice daily\par If able to maximize Entresto I would like to do that\par He will have repeat LV ejection fraction ASAP.\par Not added Spironolactone in absence of significant fibrosis on cardiac MRI and borderline potassium level.\par Continue with low-salt diet and no alcohol\par Gradually increasing exercises but avoid heavy lifting\par Follow-up and further recommendations as per Dr. Krishna and genetic counseling.\par In spite of optimal medical therapy if persistent lower LV ejection fraction or high risk ventricular arrhythmia on event recorder may need to discuss device therapy with electrophysiologist.\par \par IF findings are stable on upcoming echocardiogram, patient may proceed with knee replacement 10/5/20 and colonoscopy date TBD. Would recommend holding ASA 7 days prior to procedures. Resume ASAP per surgeon's discretion.\par \par Clearance will be completed at that time.\par \par Patient will be called with his results and follow up with Dr. Oconnell in 3 months.\par Discussed red flag symptoms, which would warrant sooner or emergent medical evaluation.\par Any questions and concerns were addressed and resolved.\par \par Sincerely,\par Janna Thompson FNP-BC\par Patient's history, testing, and plan was reviewed with supervising physician, Dr. Main Oconnell\par \par ADDENDUM 9/14/20: EF 43%, aortic root 4.4 cm, ascending aorta 4.3 cm, moderate global LVSF, mid septum and distal anterior regions are severely hypokinetic, cmopared with echo 2/27/20, slight increase in EF, dilated ascending aorta noted. I spoke with patient and communicated the findings 9/14/20 at 5:30 pm.\par \par Preoperative cardiovascular examination.\par Patient may proceed with knee replacement with Dr. Sheldon Borjas at [facility] [place] on 10/5/20. Also may proceed with upcoming colonoscopy; date TBD\par At present, there are no active cardiac conditions. \par No recent unstable coronary syndromes, decompensated heart failure, severe valvular heart disease or significant dysrhythmias.  \par Baseline functional status is acceptable.\par The clinical benefit of the proposed procedure outweighs the associated cardiovascular risk.  \par Risk not attenuated with further CV testing.  \par Prior testing as outlined above.\par Optimized from a cardiovascular perspective.\par Minimize time off ASA. May hold ASA 7 days prior to procedures. Resume ASAP per surgeon's discretion.\par Continue beta blocker\par DVT ppx\par Even fluid balance with h/o CM \par \par F/U after testing to review results.\par Discussed red flag symptoms, which would warrant sooner or emergent medical evaluation.\par Any questions and concerns were addressed and resolved.\par \par Sincerely,\par Janna Thompson FN-BC\par Patient's history, testing, and plan was reviewed with supervising physician, Dr. Main Oconnell

## 2020-10-02 ENCOUNTER — APPOINTMENT (OUTPATIENT)
Dept: DISASTER EMERGENCY | Facility: CLINIC | Age: 60
End: 2020-10-02

## 2020-10-03 LAB — SARS-COV-2 N GENE NPH QL NAA+PROBE: NOT DETECTED

## 2020-10-05 ENCOUNTER — OUTPATIENT (OUTPATIENT)
Dept: OUTPATIENT SERVICES | Facility: HOSPITAL | Age: 60
LOS: 1 days | End: 2020-10-05

## 2020-10-05 ENCOUNTER — INPATIENT (INPATIENT)
Facility: HOSPITAL | Age: 60
LOS: 1 days | Discharge: HOME HEALTH | End: 2020-10-07
Payer: COMMERCIAL

## 2020-10-05 PROCEDURE — 73560 X-RAY EXAM OF KNEE 1 OR 2: CPT | Mod: 26,LT

## 2020-10-06 ENCOUNTER — OUTPATIENT (OUTPATIENT)
Dept: OUTPATIENT SERVICES | Facility: HOSPITAL | Age: 60
LOS: 1 days | End: 2020-10-06

## 2020-10-07 ENCOUNTER — OUTPATIENT (OUTPATIENT)
Dept: OUTPATIENT SERVICES | Facility: HOSPITAL | Age: 60
LOS: 1 days | End: 2020-10-07

## 2020-11-24 ENCOUNTER — APPOINTMENT (OUTPATIENT)
Dept: HEART FAILURE | Facility: CLINIC | Age: 60
End: 2020-11-24

## 2020-12-14 ENCOUNTER — APPOINTMENT (OUTPATIENT)
Dept: DISASTER EMERGENCY | Facility: CLINIC | Age: 60
End: 2020-12-14

## 2020-12-16 LAB — SARS-COV-2 N GENE NPH QL NAA+PROBE: NOT DETECTED

## 2020-12-18 ENCOUNTER — APPOINTMENT (OUTPATIENT)
Dept: GASTROENTEROLOGY | Facility: AMBULATORY MEDICAL SERVICES | Age: 60
End: 2020-12-18

## 2020-12-31 ENCOUNTER — APPOINTMENT (OUTPATIENT)
Dept: CARDIOLOGY | Facility: CLINIC | Age: 60
End: 2020-12-31

## 2021-01-26 ENCOUNTER — APPOINTMENT (OUTPATIENT)
Dept: CARDIOLOGY | Facility: CLINIC | Age: 61
End: 2021-01-26
Payer: COMMERCIAL

## 2021-01-26 VITALS
SYSTOLIC BLOOD PRESSURE: 110 MMHG | OXYGEN SATURATION: 97 % | HEIGHT: 67 IN | BODY MASS INDEX: 32.33 KG/M2 | DIASTOLIC BLOOD PRESSURE: 62 MMHG | HEART RATE: 79 BPM | WEIGHT: 206 LBS

## 2021-01-26 DIAGNOSIS — I42.9 CARDIOMYOPATHY, UNSPECIFIED: ICD-10-CM

## 2021-01-26 PROCEDURE — 99072 ADDL SUPL MATRL&STAF TM PHE: CPT

## 2021-01-26 PROCEDURE — 93000 ELECTROCARDIOGRAM COMPLETE: CPT | Mod: NC

## 2021-01-26 PROCEDURE — 99214 OFFICE O/P EST MOD 30 MIN: CPT

## 2021-01-26 NOTE — PHYSICAL EXAM
[General Appearance - Well Developed] : well developed [Normal Appearance] : normal appearance [Well Groomed] : well groomed [General Appearance - Well Nourished] : well nourished [No Deformities] : no deformities [General Appearance - In No Acute Distress] : no acute distress [Normal Conjunctiva] : the conjunctiva exhibited no abnormalities [Eyelids - No Xanthelasma] : the eyelids demonstrated no xanthelasmas [Normal Oral Mucosa] : normal oral mucosa [No Oral Pallor] : no oral pallor [No Oral Cyanosis] : no oral cyanosis [Normal Jugular Venous A Waves Present] : normal jugular venous A waves present [Normal Jugular Venous V Waves Present] : normal jugular venous V waves present [No Jugular Venous Whalen A Waves] : no jugular venous whalen A waves [Respiration, Rhythm And Depth] : normal respiratory rhythm and effort [Exaggerated Use Of Accessory Muscles For Inspiration] : no accessory muscle use [Auscultation Breath Sounds / Voice Sounds] : lungs were clear to auscultation bilaterally [Heart Rate And Rhythm] : heart rate and rhythm were normal [Heart Sounds] : normal S1 and S2 [Murmurs] : no murmurs present [Abdomen Soft] : soft [Abdomen Tenderness] : non-tender [Abdomen Mass (___ Cm)] : no abdominal mass palpated [Abnormal Walk] : normal gait [Gait - Sufficient For Exercise Testing] : the gait was sufficient for exercise testing [Nail Clubbing] : no clubbing of the fingernails [Cyanosis, Localized] : no localized cyanosis [Petechial Hemorrhages (___cm)] : no petechial hemorrhages [Skin Color & Pigmentation] : normal skin color and pigmentation [] : no rash [No Venous Stasis] : no venous stasis [Skin Lesions] : no skin lesions [No Skin Ulcers] : no skin ulcer [No Xanthoma] : no  xanthoma was observed [Oriented To Time, Place, And Person] : oriented to person, place, and time [Affect] : the affect was normal [Mood] : the mood was normal [No Anxiety] : not feeling anxious

## 2021-01-27 NOTE — ASSESSMENT
[FreeTextEntry1] : FIOR SALINAS  is a 60 year M  who presents today Jan 26, 2021 with the above history and the following active issues. \par \par Cardiomyopathy. Minimal irregularities of coronary arteries. Normal hemodynamics with normal cardiac output and normal pulmonary capillary wedge pressure. LV gram EF 45%. Echocardiogram LV ejection 43%. Cardiac MRI 27% with left ventricular enlargement. No late gadolinium enhancement. No infiltrative process. No significant thickening. No pericardial disease.\par He will continue with carvedilol 25 mg twice daily\par Entresto 49/51 twice daily\par As per last note Spironolactone was not added in absence of significant fibrosis on cardiac MRI and borderline potassium level.\par Continue with low-salt diet and no alcohol\par Heart failure consultation.\par In spite of optimal medical therapy if recurrence of lower LV ejection fraction or high risk ventricular arrhythmia on event recorder may need to discuss device therapy with electrophysiologist.\par \par Preoperative status for upcoming colonoscopy\par At present, there are no active cardiac conditions. \par No recent unstable coronary syndromes, decompensated heart failure, severe valvular heart disease or significant dysrhythmias.  \par Baseline functional status is good with no new exertional complaints.\par The clinical benefit of the proposed procedure outweighs the associated cardiovascular risk.  \par Risk not attenuated with further CV testing.  \par Prior testing as outlined above.\par Optimized from a cardiovascular perspective.\par Minimize time off ASA\par Continue beta blocker\par DVT ppx\par Even fluid balance with h/o CHF \par \par Red flag symptoms which would warrant sooner emergent evaluation reviewed with the patient. \par Questions and concerns were addressed and answered. \par \par Sincerely,\par \par Kimberly Moore PA-C\par Patients history, testing and plan reviewed with supervising MD: Dr. Tania Pena \par

## 2021-01-27 NOTE — HISTORY OF PRESENT ILLNESS
[FreeTextEntry1] : FIOR SALINAS  is a 60 year M  who presents today Jan 26, 2021 for preoperative clearance for upcoming routine colonoscopy. Since last seen he underwent a left total knee replacement without complications. Exercising on a regular basis with great exercise capacity and no new exertional complaints. \par \par Dilated cardiomyopathy normal coronary arteries nonischemic, family history which includes his sister having nonischemic cardiomyopathy and possibly his father\par Hyperthyroidism\par Sinus tachycardia\par \par Today he denies chest pain, pressure, unusual shortness of breath, lightheadedness, dizziness, near syncope or syncope. \par \par Testing:\par \par Echo 9/10/2020 EF 43%, aortic root 4.4cm, ascending aorta 4.3cm\par \par EKG 9/8/20: SR at 82 bpm, W[place] interval 146 ms, QTc 378 ms, PRWP, nonspecific ST-T wave abnormalities \par Labs 5/29/20: TSH 2.62, K 4.6, Cr 0.81, AST 22, ALT 23, .1\par \par Echo: February 27, 2020, LVEDP normal RV no pericardial effusion, severe LV dysfunction, no pulmonary hypertension, enlarged LA size, mild mitral regurgitation LVEF Less than 35%. \par \par Cardiac Cath: February 28, 2020, Luminal irregularities. LV ejection fraction 45%. Normal pulmonary capillary wedge pressure. Normal cardiac output. \par \par Cardiac MRI: March 10, 2020, LVE. 6.0 cm. Global severe hypokinesis. LV ejection fraction 27%, normal RV size and function, no asymmetric or segmental thickening of left ventricle, no resting first-pass perfusion abnormality, no convincing focus of late gadolinium enhancement, no segmental wall motion abnormality. \par \par \par Reviewed on February 18, 2020\par EKG. February 18, 2020 per indication tachycardia per interpretation. Sinus tachycardia. Biatrial enlargement. Normal intervals.\par \par Holter monitor February 18, 2020. No significant ventricular arrhythmias.\par \par Labs March 19, 2020 Potassium 4.8 creatinine 0.75 sodium 140 serum ferritin 143 SPEP negative light chains within normal range\par \par Reviewed April 24 2020\par Labs from April 15, 2020 creatinine 1.18 potassium 5.0 sodium 138 GFR 67\par 13 days and 22 hours event monitoring showed premature atrial beats. About 4.7% of the time. No significant ventricular arrhythmias. Isolated ventricular extrasystoles. No nonsustained ventricular tachycardia.\par

## 2021-02-01 ENCOUNTER — APPOINTMENT (OUTPATIENT)
Dept: DISASTER EMERGENCY | Facility: CLINIC | Age: 61
End: 2021-02-01

## 2021-02-19 ENCOUNTER — APPOINTMENT (OUTPATIENT)
Dept: DISASTER EMERGENCY | Facility: CLINIC | Age: 61
End: 2021-02-19

## 2021-02-19 DIAGNOSIS — Z01.818 ENCOUNTER FOR OTHER PREPROCEDURAL EXAMINATION: ICD-10-CM

## 2021-02-20 LAB — SARS-COV-2 N GENE NPH QL NAA+PROBE: NOT DETECTED

## 2021-02-23 ENCOUNTER — RESULT REVIEW (OUTPATIENT)
Age: 61
End: 2021-02-23

## 2021-02-23 ENCOUNTER — APPOINTMENT (OUTPATIENT)
Dept: GASTROENTEROLOGY | Facility: AMBULATORY MEDICAL SERVICES | Age: 61
End: 2021-02-23
Payer: COMMERCIAL

## 2021-02-23 PROCEDURE — 45380 COLONOSCOPY AND BIOPSY: CPT

## 2021-03-08 ENCOUNTER — APPOINTMENT (OUTPATIENT)
Dept: CARDIOLOGY | Facility: CLINIC | Age: 61
End: 2021-03-08
Payer: COMMERCIAL

## 2021-03-08 PROCEDURE — 99072 ADDL SUPL MATRL&STAF TM PHE: CPT

## 2021-03-08 PROCEDURE — 93306 TTE W/DOPPLER COMPLETE: CPT

## 2021-03-09 ENCOUNTER — NON-APPOINTMENT (OUTPATIENT)
Age: 61
End: 2021-03-09

## 2021-07-16 ENCOUNTER — APPOINTMENT (OUTPATIENT)
Dept: HEART FAILURE | Facility: CLINIC | Age: 61
End: 2021-07-16
Payer: COMMERCIAL

## 2021-07-16 ENCOUNTER — NON-APPOINTMENT (OUTPATIENT)
Age: 61
End: 2021-07-16

## 2021-07-16 VITALS
SYSTOLIC BLOOD PRESSURE: 108 MMHG | BODY MASS INDEX: 31.23 KG/M2 | TEMPERATURE: 97.8 F | WEIGHT: 199 LBS | HEART RATE: 83 BPM | DIASTOLIC BLOOD PRESSURE: 68 MMHG | OXYGEN SATURATION: 98 % | HEIGHT: 67 IN

## 2021-07-16 PROCEDURE — 99072 ADDL SUPL MATRL&STAF TM PHE: CPT

## 2021-07-16 PROCEDURE — 99205 OFFICE O/P NEW HI 60 MIN: CPT

## 2021-07-16 PROCEDURE — 93000 ELECTROCARDIOGRAM COMPLETE: CPT

## 2021-07-16 RX ORDER — SODIUM SULFATE, POTASSIUM SULFATE, MAGNESIUM SULFATE 17.5; 3.13; 1.6 G/ML; G/ML; G/ML
17.5-3.13-1.6 SOLUTION, CONCENTRATE ORAL
Qty: 1 | Refills: 0 | Status: DISCONTINUED | COMMUNITY
Start: 2020-09-04 | End: 2021-07-16

## 2021-07-18 NOTE — PHYSICAL EXAM
[Normal] : normal conjunctiva [Normal S1, S2] : normal S1, S2 [No Murmur] : no murmur [No Rub] : no rub [Clear Lung Fields] : clear lung fields [Good Air Entry] : good air entry [No Respiratory Distress] : no respiratory distress  [Soft] : abdomen soft [Non Tender] : non-tender [Normal Gait] : normal gait [No Edema] : no edema [No Cyanosis] : no cyanosis [No Clubbing] : no clubbing [No Rash] : no rash [Moves all extremities] : moves all extremities [No Focal Deficits] : no focal deficits [Normal Speech] : normal speech [Alert and Oriented] : alert and oriented [Normal memory] : normal memory [de-identified] : supple, no JVD [de-identified] : RRR [de-identified] : TAYLOR

## 2021-07-18 NOTE — REASON FOR VISIT
[Cardiac Failure] : cardiac failure [FreeTextEntry3] : Main Oconnell MD [FreeTextEntry1] : \par Cards: Main Oconnell MD\par

## 2021-07-18 NOTE — HISTORY OF PRESENT ILLNESS
[FreeTextEntry1] : 59 y/o male with history of chronic systolic heart failure ACC/AHA stage C, non-ischemic cardiomyopathy (LVEF 45% from 25% in 3/2020), FH cardiomyopathy, nonobstructive CAD and hyperthyroidism who was referred for HF management. \par \par Mr. Wakefield reports he was first diagnosed with CMP in 2020 when he presented worsening SOB. HE has been on medical therapy since then. Serial imaging has demonstrated improved LVEF from 25 to 45%. The etiology of his CMP is not clear. He denies h/o HTN, toxins exposure or viral infections. He has h/o sinus tachycardia and thyrotoxicosis. HE underwent a cMRI in 2020 which ruled out infiltrative disease. He reports FH of CMP, denies genetic testing. \par \par He denies history of HTN, HLD, DM. He was never a smoker. Denies Etoh abuse. \par \par He reports feeling well and has never experienced symptoms related to his heart failure. He is very active without cardiopulmonary limitations. He bicycles to [place] (>60 miles) and recently went backpacking in [place]. \par \par He denies chest discomfort, palpitations, sob, dizziness/LH, lower extremity edema, orthopnea and PND. \par \par He denies any ED visits or hospital admissions for decompensated HF.

## 2021-07-18 NOTE — DISCUSSION/SUMMARY
[___ Month(s)] : in [unfilled] month(s) [FreeTextEntry1] : 59 y/o male with history of chronic systolic HF diagnosed in 3/2020, (LVEF 45% from 25%), non-ischemic cardiomyopathy, sinus tachycardia and hyperthyroidism who was referred for HF management. \par \par He remains very active without complaints, NYHA class I. Tolerating moderate GDMT. Advised to continue current medications without adjustments at this time. Will obtain baseline labwork. Follow-up in 6 months or sooner if necessary.\par \par  I, Cherry Reyes MD independently performed a history and physical examination of the patient, and formulated the management plan.\par I have reviewed the note by MOHAMUD Dhaliwal and agree with the documented findings and plan of care.\par \par 59 y/o male with history of chronic systolic heart failure ACC/AHA stage C, non-ischemic cardiomyopathy (LVEF 45% from 25% in 3/2020), FH cardiomyopathy, nonobstructive CAD and hyperthyroidism who was referred for HF management. He remains active with no limitations. Clinically euvolemic, warm extremities. Has had serial imaging which is remarkable for some reverse remodeling with LVEF improvement from 27 to 45%. Tolerating GDMT and medically stable w/o recent HF related hospitalizations.\par Will continue same medical regimen.

## 2021-07-18 NOTE — ASSESSMENT
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 Low sodium diet encouraged. Informed about lert s/s including sudden weight gain, sob, change in AT. \par Baseline labwork ordered including CMP, Mg, CBC, iron studies and pBNP.\par Cardiac rehab will not be covered based upon recent EF. Patient is very active on his own and was encouraged to continue to follow a healthy lifestyle.\par \par # Sinus tachycardia/hyperthroidism\par HR has been well controlled on BB\par On methimazole\par Continue routine f/u with [facility]\par \par \par

## 2021-11-15 ENCOUNTER — APPOINTMENT (OUTPATIENT)
Dept: CARDIOLOGY | Facility: CLINIC | Age: 61
End: 2021-11-15
Payer: COMMERCIAL

## 2021-11-15 PROCEDURE — 93356 MYOCRD STRAIN IMG SPCKL TRCK: CPT

## 2021-11-15 PROCEDURE — 93308 TTE F-UP OR LMTD: CPT

## 2021-11-23 ENCOUNTER — APPOINTMENT (OUTPATIENT)
Dept: CARDIOLOGY | Facility: CLINIC | Age: 61
End: 2021-11-23
Payer: COMMERCIAL

## 2021-11-23 VITALS — HEART RATE: 60 BPM

## 2021-11-23 VITALS — BODY MASS INDEX: 30.29 KG/M2 | HEIGHT: 67 IN | WEIGHT: 193 LBS

## 2021-11-23 DIAGNOSIS — Z86.79 PERSONAL HISTORY OF OTHER DISEASES OF THE CIRCULATORY SYSTEM: ICD-10-CM

## 2021-11-23 DIAGNOSIS — Z01.818 ENCOUNTER FOR OTHER PREPROCEDURAL EXAMINATION: ICD-10-CM

## 2021-11-23 DIAGNOSIS — Z87.898 PERSONAL HISTORY OF OTHER SPECIFIED CONDITIONS: ICD-10-CM

## 2021-11-23 DIAGNOSIS — Z01.810 ENCOUNTER FOR PREPROCEDURAL CARDIOVASCULAR EXAMINATION: ICD-10-CM

## 2021-11-23 PROCEDURE — 99443: CPT

## 2021-11-23 NOTE — REASON FOR VISIT
[Symptom and Test Evaluation] : symptom and test evaluation [Cardiac Failure] : cardiac failure [Structural Heart and Valve Disease] : structural heart and valve disease [FreeTextEntry3] : Dr. Christiano Flores [FreeTextEntry1] : Consent: Patient consented to telephone visit.\par Time: A total of 20 minutes was spent in review of pertinent medical records, discussion with the patient, evaluation of patient problem, and coordination of a care plan as part of this telephone visit.\par \par No physical examination was performed at this visit for performed virtually with exceptions as noted below.\par \par Physical examination was not performed as a  the risk of COVID-19 cross-contamination to be greater than the benefit to the patient conferred by the physical examination.\par

## 2021-11-23 NOTE — HISTORY OF PRESENT ILLNESS
[FreeTextEntry1] : 62 y/o male with history of chronic systolic heart failure ACC/AHA stage C, non-ischemic cardiomyopathy (LVEF 45% from 25% in 3/2020), FH cardiomyopathy, nonobstructive CAD and hyperthyroidism who was referred for HF management. \par \par He denies history of HTN, HLD, DM. He was never a smoker. Denies Etoh abuse. \par \par He reports feeling well and has never experienced symptoms related to his heart failure. He is very active without cardiopulmonary limitations.  Extensive bicycling and continue to have aggressive activity and exercise without any significant limitations.\par \par He denies chest discomfort, palpitations, sob, dizziness/LH, lower extremity edema, orthopnea and PND. \par \par He denies any ED visits or hospital admissions for decompensated HF.

## 2021-11-23 NOTE — ASSESSMENT
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 Low sodium diet encouraged. Informed about lert s/s including sudden weight gain, sob, change in AT. \par Baseline labwork ordered including CMP, Mg, CBC, iron studies and pBNP.\par Cardiac rehab will not be covered based upon recent EF. Patient is very active on his own and was encouraged to continue to follow a healthy lifestyle.\par \par # Sinus tachycardia/hyperthroidism\par HR has been well controlled on BB\par On methimazole\par Continue routine f/u with [facility]\par \par \par

## 2021-11-23 NOTE — DISCUSSION/SUMMARY
[___ Month(s)] : in [unfilled] month(s) [FreeTextEntry1] : 62 y/o male with history of chronic systolic HF diagnosed in 3/2020, (LVEF 45% from 25%), most recent echocardiogram November 15, 2021 showed LV ejection fraction 47% with LVIDD 5.9 cm.  Global longitudinal strain -17.5% which was borderline abnormal.  Non-ischemic cardiomyopathy, sinus tachycardia and hyperthyroidism \par \par He remains very active without complaints, NYHA class I. Tolerating moderate GDMT. \par Further discussion with heart failure specialist regarding addition of SGLT2 inhibitor/MRA to the present regimen.\par Recommended continue compliance with medication.  Pathophysiology of cardiomyopathy reviewed.\par High risk symptoms to notify as discussed.\par \par No signs of volume overload based on the clinical description of not having edema, orthopnea and stable weight.\par Continue low-salt diet\par Avoidance of alcohol\par Regular exercise program\par Weight reduction.\par \par Counseling regarding low saturated fat, salt and carbohydrate intake was reviewed. Active lifestyle and regular. Exercise along with weight management is advised.\par All the above were at length reviewed. Answered all the questions. Thank you very much for this kind referral. Please do not hesitate to give me a call for any question.\par Part of this transcription was done with voice recognition software and phonetically similar errors are common. I apologize for that. Please do not hesitate to call for any questions due to above.\par Labs ordered.  Prior to evaluation with Dr. Reyes\par I will be happy to see him in about a year as he does follow with heart failure specialist more often.

## 2021-11-23 NOTE — CARDIOLOGY SUMMARY
[de-identified] : 7/16/21: Sinus rhythm @ 85bmp, PRWP, no acute ST-T changes [de-identified] : 3/8/21: LVEF 45%, LVIDd 4.7cm, aortic root 4.3cm/ascending aorta 4.3cm/aortic arch 3.5cm, mild [place], minimal MR\par \par 9/10/20: LVEF 43%, aortic root 4.4cm, ascending aorta 4.3cm\par \par 2/27/20: LVEF <35%, LAE, mild MR, no pulmonary HTN [de-identified] : 3/10/20: LVEF 27%, LVE 6.0cm, CO 5.5, CI 2.78, mild MR, no convincing focus of LGE [de-identified] : 2/28/20: Luminal irregularities. LVEF 45%. Normal pulmonary capillary wedge pressure. Normal cardiac output.

## 2021-12-14 ENCOUNTER — NON-APPOINTMENT (OUTPATIENT)
Age: 61
End: 2021-12-14

## 2022-01-28 ENCOUNTER — NON-APPOINTMENT (OUTPATIENT)
Age: 62
End: 2022-01-28

## 2022-01-28 ENCOUNTER — APPOINTMENT (OUTPATIENT)
Dept: HEART FAILURE | Facility: CLINIC | Age: 62
End: 2022-01-28
Payer: COMMERCIAL

## 2022-01-28 VITALS
HEIGHT: 67 IN | WEIGHT: 204 LBS | DIASTOLIC BLOOD PRESSURE: 84 MMHG | TEMPERATURE: 97.3 F | OXYGEN SATURATION: 99 % | BODY MASS INDEX: 32.02 KG/M2 | HEART RATE: 78 BPM | SYSTOLIC BLOOD PRESSURE: 112 MMHG

## 2022-01-28 DIAGNOSIS — E05.90 THYROTOXICOSIS, UNSPECIFIED W/OUT THYROTOXIC CRISIS OR STORM: ICD-10-CM

## 2022-01-28 PROCEDURE — 99214 OFFICE O/P EST MOD 30 MIN: CPT

## 2022-01-28 PROCEDURE — 93000 ELECTROCARDIOGRAM COMPLETE: CPT

## 2022-01-28 RX ORDER — METHIMAZOLE 5 MG/1
5 TABLET ORAL DAILY
Refills: 0 | Status: ACTIVE | COMMUNITY

## 2022-01-28 RX ORDER — ASPIRIN 81 MG
81 TABLET, DELAYED RELEASE (ENTERIC COATED) ORAL DAILY
Refills: 0 | Status: ACTIVE | COMMUNITY

## 2022-01-30 PROBLEM — E05.90 HYPERTHYROIDISM: Status: ACTIVE | Noted: 2020-02-18

## 2022-01-30 NOTE — CARDIOLOGY SUMMARY
[de-identified] : 7/16/21: Sinus rhythm @ 85bmp, PRWP, no acute ST-T changes [de-identified] : 11/15/2021 TTE LVEF 47% LVIDd 5.9cm GLPS -17.5%\par \par 3/8/21: LVEF 45%, LVIDd 4.7cm, aortic root 4.3cm/ascending aorta 4.3cm/aortic arch 3.5cm, mild VA, minimal MR\par \par 9/10/20: LVEF 43%, aortic root 4.4cm, ascending aorta 4.3cm\par \par 2/27/20: LVEF <35%, LAE, mild MR, no pulmonary HTN [de-identified] : 3/10/20: LVEF 27%, LVE 6.0cm, CO 5.5, CI 2.78, mild MR, no convincing focus of LGE [de-identified] : 2/28/20: Luminal irregularities. LVEF 45%. Normal pulmonary capillary wedge pressure. Normal cardiac output.

## 2022-01-30 NOTE — DISCUSSION/SUMMARY
[FreeTextEntry1] : \par 62 y/o male with history HFrecEF <25% to 47%, non-ischemic cardiomyopathy, FH cardiomyopathy, nonobstructive CAD and hyperthyroidism who was referred for HF management. He remains active with no limitations. Clinically euvolemic, warm extremities. Has had serial imaging which is remarkable for some reverse remodeling with LVEF improvement from 27 to 47%. Tolerating GDMT and medically stable w/o recent HF related hospitalizations.\par Will continue same medical regimen.  \par Will gollow up on an annual basis or sooner if there is any change in his clinical status.

## 2022-01-30 NOTE — HISTORY OF PRESENT ILLNESS
[FreeTextEntry1] : 61 y/o male with history of chronic systolic heart failure ACC/AHA stage C, non-ischemic cardiomyopathy (LVEF 45% from 25% in 3/2020), FH cardiomyopathy, nonobstructive CAD and hyperthyroidism who was referred for HF management. \par \par Mr. Wakefield reports he was first diagnosed with CMP in 2020 when he presented worsening SOB. HE has been on medical therapy since then. Serial imaging has demonstrated improved LVEF from 25 to 45%. The etiology of his CMP is not clear. He denies h/o HTN, toxins exposure or viral infections. He has h/o sinus tachycardia and thyrotoxicosis. HE underwent a cMRI in 2020 which ruled out infiltrative disease. He reports FH of CMP, denies genetic testing. \par \par Today he comes for routine follow up. He reports no overt heart failure symptoms, specifically denies orthopnea, PND, bendopnea, LE edema, chest discomfort, dizziness/lightheadedness, palpitations or syncope. No recent hospitalizations or visits to the ED. \par Labs from 12/13/21 reviewed.\par \par

## 2022-01-30 NOTE — ASSESSMENT
[FreeTextEntry1] : # HFrecEF (LVEF 47%, LVIDd 5.9cm) GLPS -17.5%\par NICMP, NYHA class I\par Euvolemic, extremities lukewarm peripherally\par GDMT: Entresto 49-51mg BID, Carvedilol 25mg BID\par Diuretics: Not needed at this time\par Device: Not indicated \par HF education provided including lifestyle changes (low Na diet, fluid restriction, increase physical activity), current clinical condition, natural progression and prognosis.\par \par \par # Sinus tachycardia/hyperthroidism\par HR has been well controlled on BB\par On methimazole\par Continue routine f/u with [facility]\par \par \par

## 2022-09-13 ENCOUNTER — APPOINTMENT (OUTPATIENT)
Dept: CARDIOLOGY | Facility: CLINIC | Age: 62
End: 2022-09-13

## 2022-09-13 VITALS
HEIGHT: 67 IN | BODY MASS INDEX: 31.39 KG/M2 | TEMPERATURE: 97.1 F | SYSTOLIC BLOOD PRESSURE: 120 MMHG | WEIGHT: 200 LBS | DIASTOLIC BLOOD PRESSURE: 70 MMHG | HEART RATE: 79 BPM | OXYGEN SATURATION: 96 %

## 2022-09-13 PROCEDURE — 99214 OFFICE O/P EST MOD 30 MIN: CPT

## 2022-09-13 NOTE — HISTORY OF PRESENT ILLNESS
[FreeTextEntry1] : 60 y/o male with history of chronic systolic heart failure ACC/AHA stage C, non-ischemic cardiomyopathy (LVEF 45% from 25% in 3/2020), FH cardiomyopathy, nonobstructive CAD and hyperthyroidism who was referred for HF management. \par \par He denies history of HTN, HLD, DM. He was never a smoker. Denies Etoh abuse. \par \par He reports feeling well and has never experienced symptoms related to his heart failure. He is very active without cardiopulmonary limitations.  Extensive bicycling and continue to have aggressive activity and exercise without any significant limitations.\par \par He denies chest discomfort, palpitations, sob, dizziness/LH, lower extremity edema, orthopnea and PND. \par \par He denies any ED visits or hospital admissions for decompensated HF.

## 2022-09-13 NOTE — DISCUSSION/SUMMARY
[FreeTextEntry1] : 61-year-old gentleman with above medical history active medical problems as noted below\par #Chronic HFrEF (LVEF 47%, LVIDd 5.7)\par NICMP, NYHA class I\par Euvolemic, extremities warm to touch\par GDMT: Entresto 49-51mg BID, Carvedilol 25mg BID\par Diuretics: Not needed at this time\par Device: Not indicated \par HF education provided including the natural progression of HF and necessity of continuing medications despite improvement in heart function. Low sodium diet encouraged. Informed about lert s/s including sudden weight gain, sob, change in AT. \par Echocardiogram ordered for LV ejection fraction dimension in presence of history of nonischemic cardiomyopathy.  \par \par # Sinus tachycardia/hyperthroidism\par HR has been well controlled on BB\par On methimazole\par Continue routine f/u with [facility]\par \par Counseling regarding low saturated fat, salt and carbohydrate intake was reviewed. Active lifestyle and regular. Exercise along with weight management is advised.\par All the above were at length reviewed. Answered all the questions. Thank you very much for this kind referral. Please do not hesitate to give me a call for any question.\par Part of this transcription was done with voice recognition software and phonetically similar errors are common. I apologize for that. Please do not hesitate to call for any questions due to above.\par Labs ordered.  Prior to evaluation with Dr. Reyes\par \par Sincerely,\par Main Oconnell MD,FACC,FASE\par

## 2022-09-13 NOTE — CARDIOLOGY SUMMARY
[de-identified] : 7/16/21: Sinus rhythm @ 85bmp, PRWP, no acute ST-T changes\par January 28, 2022.  Normal sinus rhythm poor R wave progression [de-identified] : 3/8/21: LVEF 45%, LVIDd 4.7cm, aortic root 4.3cm/ascending aorta 4.3cm/aortic arch 3.5cm, mild OH, minimal MR\par \par 9/10/20: LVEF 43%, aortic root 4.4cm, ascending aorta 4.3cm\par \par 2/27/20: LVEF <35%, LAE, mild MR, no pulmonary HTN [de-identified] : 3/10/20: LVEF 27%, LVE 6.0cm, CO 5.5, CI 2.78, mild MR, no convincing focus of LGE [de-identified] : 2/28/20: Luminal irregularities. LVEF 45%. Normal pulmonary capillary wedge pressure. Normal cardiac output.

## 2022-09-13 NOTE — ASSESSMENT
[FreeTextEntry1] : Reviewed on September 13, 2022 N-terminal proBNP 83.7  HDL 46 triglycerides 74 total cholesterol 175 creatinine 0.8 potassium 4.7 sodium 139 LFT normal CBC stable\par \par \par

## 2022-09-13 NOTE — PHYSICAL EXAM
[Well Developed] : well developed [Well Nourished] : well nourished [No Acute Distress] : no acute distress [Normal Venous Pressure] : normal venous pressure [No Carotid Bruit] : no carotid bruit [Normal S1, S2] : normal S1, S2 [No Murmur] : no murmur [No Rub] : no rub [No Gallop] : no gallop [Clear Lung Fields] : clear lung fields [Good Air Entry] : good air entry [No Respiratory Distress] : no respiratory distress  [Normal Gait] : normal gait [No Edema] : no edema [No Cyanosis] : no cyanosis [No Clubbing] : no clubbing [No Varicosities] : no varicosities [Normal Radial B/L] : normal radial B/L [Normal DP B/L] : normal DP B/L [Moves all extremities] : moves all extremities [Normal Speech] : normal speech [Alert and Oriented] : alert and oriented [de-identified] : Warm to touch

## 2022-09-13 NOTE — REASON FOR VISIT
[Symptom and Test Evaluation] : symptom and test evaluation [Cardiac Failure] : cardiac failure [Structural Heart and Valve Disease] : structural heart and valve disease [FreeTextEntry3] : Dr. Christiano Flores [FreeTextEntry1] : \par

## 2022-12-06 ENCOUNTER — APPOINTMENT (OUTPATIENT)
Dept: CARDIOLOGY | Facility: CLINIC | Age: 62
End: 2022-12-06

## 2022-12-06 PROCEDURE — 93306 TTE W/DOPPLER COMPLETE: CPT

## 2022-12-13 ENCOUNTER — NON-APPOINTMENT (OUTPATIENT)
Age: 62
End: 2022-12-13

## 2023-02-17 ENCOUNTER — APPOINTMENT (OUTPATIENT)
Dept: HEART FAILURE | Facility: CLINIC | Age: 63
End: 2023-02-17
Payer: COMMERCIAL

## 2023-02-17 VITALS
HEIGHT: 67 IN | HEART RATE: 72 BPM | WEIGHT: 203.38 LBS | DIASTOLIC BLOOD PRESSURE: 72 MMHG | SYSTOLIC BLOOD PRESSURE: 116 MMHG | TEMPERATURE: 96.9 F | BODY MASS INDEX: 31.92 KG/M2 | OXYGEN SATURATION: 96 %

## 2023-02-17 DIAGNOSIS — E05.90 THYROTOXICOSIS, UNSPECIFIED W/OUT THYROTOXIC CRISIS OR STORM: ICD-10-CM

## 2023-02-17 PROCEDURE — 99214 OFFICE O/P EST MOD 30 MIN: CPT

## 2023-03-08 PROBLEM — E05.90 THYROTOXICOSIS: Status: ACTIVE | Noted: 2020-02-18

## 2023-03-08 NOTE — HISTORY OF PRESENT ILLNESS
[FreeTextEntry1] : 59 y/o male with history of chronic systolic heart failure ACC/AHA stage C, non-ischemic cardiomyopathy (LVEF 45% from 25% in 3/2020), FH cardiomyopathy, nonobstructive CAD and hyperthyroidism who was referred for HF management. \par \par Mr. Wakefield reports he was first diagnosed with CMP in 2020 when he presented worsening SOB. HE has been on medical therapy since then. Serial imaging has demonstrated improved LVEF from 25 to 45%. The etiology of his CMP is not clear. He denies h/o HTN, toxins exposure or viral infections. He has h/o sinus tachycardia and thyrotoxicosis. HE underwent a cMRI in 2020 which ruled out infiltrative disease. He reports FH of CMP, denies genetic testing. \par \par Today he comes for routine follow up. He reports no overt heart failure symptoms, specifically denies orthopnea, PND, bendopnea, LE edema, chest discomfort, dizziness/lightheadedness, palpitations or syncope. No recent hospitalizations or visits to the ED. \par His last NT proBNP was 83.7. \par

## 2023-03-08 NOTE — DISCUSSION/SUMMARY
[FreeTextEntry1] : \par 60 y/o male with history HFrecEF <25% to 47%, non-ischemic cardiomyopathy, FH cardiomyopathy, nonobstructive CAD and hyperthyroidism who was referred for HF management. He remains active with no limitations. Clinically euvolemic, warm extremities. Has had serial imaging which is remarkable for some reverse remodeling with LVEF improvement from 27 to 47%. Tolerating GDMT and medically stable w/o recent HF related hospitalizations. He remains asymptomatic, NYHA I. \par Will continue same medical regimen.  \par Will follow up on an annual basis or sooner if there is any change in his clinical status.

## 2023-03-08 NOTE — CARDIOLOGY SUMMARY
[de-identified] : 7/16/21: Sinus rhythm @ 85bmp, PRWP, no acute ST-T changes [de-identified] : 11/15/2021 TTE LVEF 47% LVIDd 5.9cm GLPS -17.5%\par \par 3/8/21: LVEF 45%, LVIDd 4.7cm, aortic root 4.3cm/ascending aorta 4.3cm/aortic arch 3.5cm, mild ND, minimal MR\par \par 9/10/20: LVEF 43%, aortic root 4.4cm, ascending aorta 4.3cm\par \par 2/27/20: LVEF <35%, LAE, mild MR, no pulmonary HTN [de-identified] : 3/10/20: LVEF 27%, LVE 6.0cm, CO 5.5, CI 2.78, mild MR, no convincing focus of LGE [de-identified] : 2/28/20: Luminal irregularities. LVEF 45%. Normal pulmonary capillary wedge pressure. Normal cardiac output.

## 2023-03-08 NOTE — ASSESSMENT
[FreeTextEntry1] : # HFrecEF (LVEF 47%, LVIDd 5.9cm) GLPS -17.5%\par NICMP, NYHA class I\par Euvolemic, extremities lukewarm peripherally\par GDMT: Entresto 49-51mg BID, Carvedilol 25mg BID\par Diuretics: Not needed at this time\par Device: Not indicated \par HF education provided including lifestyle changes (low Na diet, fluid restriction, increase physical activity), current clinical condition, natural progression and prognosis.\par \par \par # Sinus tachycardia/hyperthroidism\par HR has been well controlled on BB\par On methimazole\par Continue routine f/u with [facility]\par \par \par

## 2023-09-13 ENCOUNTER — RX RENEWAL (OUTPATIENT)
Age: 63
End: 2023-09-13

## 2023-09-13 RX ORDER — CARVEDILOL 25 MG/1
25 TABLET, FILM COATED ORAL TWICE DAILY
Qty: 180 | Refills: 3 | Status: ACTIVE | COMMUNITY
Start: 2020-03-19 | End: 1900-01-01

## 2023-09-15 ASSESSMENT — KOOS JR
TWISING OR PIVOTING ON KNEE: EXTREME
RISING FROM SITTING: MILD
BENDING TO THE FLOOR TO PICK UP OBJECT: EXTREME
HOW SEVERE IS YOUR KNEE STIFFNESS AFTER FIRST WAKING IN MORNING: MILD
TWISING OR PIVOTING ON KNEE: EXTREME
KOOS JR RAW SCORE: 21
BENDING TO THE FLOOR TO PICK UP OBJECT: MILD
GOING UP OR DOWN STAIRS: MILD
TWISING OR PIVOTING ON KNEE: MILD
GOING UP OR DOWN STAIRS: EXTREME
TWISING OR PIVOTING ON KNEE: MILD
RISING FROM SITTING: EXTREME
KOOS JR RAW SCORE: 5
STANDING UPRIGHT: MILD
HOW SEVERE IS YOUR KNEE STIFFNESS AFTER FIRST WAKING IN MORNING: EXTREME
BENDING TO THE FLOOR TO PICK UP OBJECT: MILD
HOW SEVERE IS YOUR KNEE STIFFNESS AFTER FIRST WAKING IN MORNING: MILD
RISING FROM SITTING: EXTREME
GOING UP OR DOWN STAIRS: MILD
IMPORTED FORM: YES
RISING FROM SITTING: MILD
IMPORTED KOOS JR SCORE: 73.34
IMPORTED LATERALITY: LEFT
KOOS JR RAW SCORE: 21
IMPORTED KOOS JR SCORE: 34.17
IMPORTED KOOS JR SCORE: 34.17
STANDING UPRIGHT: MILD
GOING UP OR DOWN STAIRS: EXTREME
IMPORTED FORM: YES
IMPORTED LATERALITY: LEFT
IMPORTED KOOS JR SCORE: 73.34
KOOS JR RAW SCORE: 5
HOW SEVERE IS YOUR KNEE STIFFNESS AFTER FIRST WAKING IN MORNING: EXTREME
BENDING TO THE FLOOR TO PICK UP OBJECT: EXTREME

## 2023-09-27 ENCOUNTER — APPOINTMENT (OUTPATIENT)
Dept: CARDIOLOGY | Facility: CLINIC | Age: 63
End: 2023-09-27
Payer: COMMERCIAL

## 2023-09-27 ENCOUNTER — NON-APPOINTMENT (OUTPATIENT)
Age: 63
End: 2023-09-27

## 2023-09-27 VITALS
SYSTOLIC BLOOD PRESSURE: 120 MMHG | HEIGHT: 67 IN | OXYGEN SATURATION: 96 % | WEIGHT: 200 LBS | BODY MASS INDEX: 31.39 KG/M2 | DIASTOLIC BLOOD PRESSURE: 72 MMHG | HEART RATE: 67 BPM

## 2023-09-27 DIAGNOSIS — R94.31 ABNORMAL ELECTROCARDIOGRAM [ECG] [EKG]: ICD-10-CM

## 2023-09-27 PROCEDURE — 93000 ELECTROCARDIOGRAM COMPLETE: CPT

## 2023-09-27 PROCEDURE — 99214 OFFICE O/P EST MOD 30 MIN: CPT | Mod: 25

## 2023-12-11 ENCOUNTER — APPOINTMENT (OUTPATIENT)
Dept: CARDIOLOGY | Facility: CLINIC | Age: 63
End: 2023-12-11

## 2023-12-27 ENCOUNTER — APPOINTMENT (OUTPATIENT)
Dept: CARDIOLOGY | Facility: CLINIC | Age: 63
End: 2023-12-27
Payer: COMMERCIAL

## 2023-12-27 PROCEDURE — 93306 TTE W/DOPPLER COMPLETE: CPT

## 2023-12-28 RX ORDER — SILDENAFIL 50 MG/1
50 TABLET ORAL
Qty: 10 | Refills: 1 | Status: ACTIVE | COMMUNITY
Start: 2020-03-19 | End: 1900-01-01

## 2024-01-25 NOTE — PHYSICAL EXAM
[No Rash] : no rash [Normal] : no edema, no cyanosis, no clubbing, no varicosities [Alert and Oriented] : alert and oriented

## 2024-01-26 ENCOUNTER — APPOINTMENT (OUTPATIENT)
Dept: HEART FAILURE | Facility: CLINIC | Age: 64
End: 2024-01-26
Payer: COMMERCIAL

## 2024-01-26 VITALS
SYSTOLIC BLOOD PRESSURE: 116 MMHG | WEIGHT: 201.5 LBS | HEIGHT: 67 IN | DIASTOLIC BLOOD PRESSURE: 72 MMHG | HEART RATE: 84 BPM | BODY MASS INDEX: 31.63 KG/M2 | OXYGEN SATURATION: 95 %

## 2024-01-26 DIAGNOSIS — I50.20 UNSPECIFIED SYSTOLIC (CONGESTIVE) HEART FAILURE: ICD-10-CM

## 2024-01-26 DIAGNOSIS — E78.5 HYPERLIPIDEMIA, UNSPECIFIED: ICD-10-CM

## 2024-01-26 DIAGNOSIS — I42.8 OTHER CARDIOMYOPATHIES: ICD-10-CM

## 2024-01-26 DIAGNOSIS — I49.1 ATRIAL PREMATURE DEPOLARIZATION: ICD-10-CM

## 2024-01-26 PROCEDURE — 99214 OFFICE O/P EST MOD 30 MIN: CPT

## 2024-01-26 PROCEDURE — G2211 COMPLEX E/M VISIT ADD ON: CPT

## 2024-01-26 RX ORDER — SACUBITRIL AND VALSARTAN 97; 103 MG/1; MG/1
97-103 TABLET, FILM COATED ORAL TWICE DAILY
Qty: 180 | Refills: 1 | Status: ACTIVE | COMMUNITY
Start: 2020-04-01 | End: 1900-01-01

## 2024-04-04 ENCOUNTER — APPOINTMENT (OUTPATIENT)
Dept: GASTROENTEROLOGY | Facility: CLINIC | Age: 64
End: 2024-04-04

## 2024-04-10 RX ORDER — ROSUVASTATIN CALCIUM 10 MG/1
10 TABLET, FILM COATED ORAL
Qty: 90 | Refills: 1 | Status: ACTIVE | COMMUNITY
Start: 2023-09-27 | End: 1900-01-01

## 2024-04-28 PROBLEM — I42.8 CARDIOMYOPATHY, NONISCHEMIC: Status: ACTIVE | Noted: 2021-07-16

## 2024-04-28 PROBLEM — E78.5 DYSLIPIDEMIA: Status: ACTIVE | Noted: 2022-09-13

## 2024-04-28 PROBLEM — I50.20 HEART FAILURE WITH REDUCED LEFT VENTRICULAR FUNCTION: Status: ACTIVE | Noted: 2021-07-16

## 2024-04-28 PROBLEM — I49.1 PAC (PREMATURE ATRIAL CONTRACTION): Status: ACTIVE | Noted: 2020-04-24

## 2024-04-28 NOTE — CARDIOLOGY SUMMARY
[de-identified] : 9/27/23: SR, 71 bpm, low voltage in precordial leads, PRWP 7/16/21: Sinus rhythm @ 85bmp, PRWP, no acute ST-T changes [de-identified] : 12/27/23 TTE: LVEF 45-50%, normal RV size/function, mild MR/TR, prolapse of anterior MF leaflet, PASP 19 mmHg.  11/15/2021 TTE LVEF 47% LVIDd 5.9cm GLPS -17.5%  3/8/21: LVEF 45%, LVIDd 4.7cm, aortic root 4.3cm/ascending aorta 4.3cm/aortic arch 3.5cm, mild MT, minimal MR  9/10/20: LVEF 43%, aortic root 4.4cm, ascending aorta 4.3cm  2/27/20: LVEF <35%, LAE, mild MR, no pulmonary HTN [de-identified] : 3/10/20: LVEF 27%, LVE 6.0cm, CO 5.5, CI 2.78, mild MR, no convincing focus of LGE [de-identified] : 2/28/20: Luminal irregularities. LVEF 45%. Normal pulmonary capillary wedge pressure. Normal cardiac output.

## 2024-04-28 NOTE — ASSESSMENT
[FreeTextEntry1] : # HFimpEF (LVEF now 45-50%) NICMP, NYHA class I Euvolemic, extremities lukewarm peripherally GDMT: Increase Entresto to  mg BID and continue Carvedilol 25mg BID. Repeat labs in 1 week. Diuretics: Not needed at this time Device: Not indicated HF education provided including lifestyle changes (low Na diet, fluid restriction, increase physical activity), current clinical condition, natural progression and prognosis.  # Sinus tachycardia/hyperthroidism HR has been well controlled on BB On methimazole Continue routine f/u with [facility]  RTO in 1 year with Dr. Brice in [place] which is closer to his home.

## 2024-04-28 NOTE — END OF VISIT
[FreeTextEntry3] : ICherry MD independently performed a history and physical examination of the patient, and formulated the management plan.I have reviewed the note by MOHAMUD Diaz and agree with the documented findings and plan of care.  [Time Spent: ___ minutes] : I have spent [unfilled] minutes of time on the encounter.

## 2024-04-28 NOTE — HISTORY OF PRESENT ILLNESS
[FreeTextEntry1] : 62 y/o male with history of chronic systolic heart failure ACC/AHA stage C, non-ischemic cardiomyopathy (LVEF improved to 45% from 25% in 3/2020), FH cardiomyopathy, nonobstructive CAD and hyperthyroidism who was referred for HF management.   Mr. Wakefield reports he was first diagnosed with CMP in 2020 when he presented worsening SOB. HE has been on medical therapy since then. Serial imaging has demonstrated improved LVEF from 25 to 45%. The etiology of his CMP is not clear. He denies h/o HTN, toxins exposure or viral infections. He has h/o sinus tachycardia and thyrotoxicosis. HE underwent a cMRI in 2020 which ruled out infiltrative disease. He reports FH of CMP, denies genetic testing.   Today he comes for routine follow up. He reports no overt heart failure symptoms, specifically denies orthopnea, PND, bendopnea, LE edema, chest discomfort, dizziness/lightheadedness, palpitations or syncope. His primary complaint is shoulder pain and states he will need b/l should repair at some point. He does not exercise on a daily basis but is active taking care of his horses and is able to lift heavy objects without any issues. No recent hospitalizations or visits to the ED.

## 2024-04-28 NOTE — REASON FOR VISIT
[Cardiac Failure] : cardiac failure [FreeTextEntry3] : Main Oconnell MD [FreeTextEntry1] : Cards: Main Oconnell MD HF: Cherry Reyes MD

## 2024-04-28 NOTE — DISCUSSION/SUMMARY
[FreeTextEntry1] : 64 y/o male with history of chronic systolic heart failure ACC/AHA stage C, non-ischemic cardiomyopathy (LVEF improved to 45% from 25% in 3/2020), FH cardiomyopathy, nonobstructive CAD and hyperthyroidism who was referred for HF management. He remains active with no limitations. Clinically euvolemic, warm extremities. Has had serial imaging which is remarkable for some reverse remodeling with LVEF improvement from 27 to 47%. Will continue to optimize his GDMT as above. Will follow up on an annual basis or sooner if there is any change in his clinical status. Continue shared care with Dr. Oconnell.

## 2024-05-31 NOTE — CARDIOLOGY SUMMARY
[de-identified] : 7/16/21: Sinus rhythm @ 85bmp, PRWP, no acute ST-T changes January 28, 2022.  Normal sinus rhythm poor R wave progression September 27, 2023.  Normal sinus rhythm.  Inferior Q waves.  Poor R wave progression. [de-identified] : 2/27/20: LVEF <35%, LAE, mild MR, no pulmonary HTN 3/8/21: LVEF 45%, LVIDd 4.7cm, aortic root 4.3cm/ascending aorta 4.3cm/aortic arch 3.5cm, mild RI, minimal MR 9/10/20: LVEF 43%, aortic root 4.4cm, ascending aorta 4.3cm December 6, 2022 EF 44% by biplane.  45 to 50% visually.  Mild mitral regurgitation.  Mildly dilated aortic root.  Mild TR.  Mitral valve prolapse.  [de-identified] : 3/10/20: LVEF 27%, LVE 6.0cm, CO 5.5, CI 2.78, mild MR, no convincing focus of LGE [de-identified] : 2/28/20: Luminal irregularities. LVEF 45%. Normal pulmonary capillary wedge pressure. Normal cardiac output.

## 2024-05-31 NOTE — ASSESSMENT
[FreeTextEntry1] : Reviewed on September 13, 2022 N-terminal proBNP 83.7  HDL 46 triglycerides 74 total cholesterol 175 creatinine 0.8 potassium 4.7 sodium 139 LFT normal CBC stable  Reviewed on September 27, 2023. EKG as noted above. Labs from September 21, 2023 sodium 139 potassium 4.6 creatinine 0.9.  N-terminal proBNP 62.  Total cholesterol 184 triglycerides 84 HDL 47 .

## 2024-05-31 NOTE — HISTORY OF PRESENT ILLNESS
[FreeTextEntry1] : 61 y/o male with history of chronic systolic heart failure ACC/AHA stage C, Class 1, non-ischemic cardiomyopathy (LVEF 45% from 25% in 3/2020), FH cardiomyopathy, nonobstructive CAD and hyperthyroidism who was referred for HF management.   He denies history of HTN, HLD, DM. He was never a smoker. Denies Etoh abuse.   He reports feeling well and has never experienced symptoms related to his heart failure. He is very active without cardiopulmonary limitations.  Extensive bicycling and continue to have aggressive activity and exercise without any significant limitations. According to him his diet is not good during the summer months.  And he has right shoulder arthritis limiting his activity level.  He denies chest discomfort, palpitations, sob, dizziness/LH, lower extremity edema, orthopnea and PND.  He has no near syncopal or syncopal event. He denies any ED visits or hospital admissions for decompensated HF.

## 2024-05-31 NOTE — REASON FOR VISIT
[Symptom and Test Evaluation] : symptom and test evaluation [Cardiac Failure] : cardiac failure [Structural Heart and Valve Disease] : structural heart and valve disease [FreeTextEntry1] : \par

## 2024-05-31 NOTE — PHYSICAL EXAM
[Well Developed] : well developed [Well Nourished] : well nourished [No Acute Distress] : no acute distress [Normal Venous Pressure] : normal venous pressure [No Carotid Bruit] : no carotid bruit [Normal S1, S2] : normal S1, S2 [No Murmur] : no murmur [No Rub] : no rub [No Gallop] : no gallop [Clear Lung Fields] : clear lung fields [Good Air Entry] : good air entry [No Respiratory Distress] : no respiratory distress  [Normal Gait] : normal gait [No Edema] : no edema [No Cyanosis] : no cyanosis [No Clubbing] : no clubbing [No Varicosities] : no varicosities [Normal Radial B/L] : normal radial B/L [Normal DP B/L] : normal DP B/L [Normal Speech] : normal speech [Moves all extremities] : moves all extremities [Alert and Oriented] : alert and oriented [de-identified] : Warm to touch

## 2024-05-31 NOTE — DISCUSSION/SUMMARY
[FreeTextEntry1] : 62-year-old gentleman with above medical history active medical problems as noted below #Chronic HFrEF (LVEF 44%, LVIDd 5.2 12/22) NICMP, NYHA class I, Stage C Euvolemic, extremities warm to touch GDMT: Entresto 49-51mg BID, Carvedilol 25mg BID Diuretics: Not needed at this time Device: Not indicated  HF education provided including the natural progression of HF and necessity of continuing medications despite improvement in heart function. Low sodium diet encouraged. Informed about lert s/s including sudden weight gain, sob, change in AT.  Echocardiogram ordered for LV ejection fraction dimension in presence of history of nonischemic cardiomyopathy.  Important to follow to appropriately adjust management to decrease hospital admissions and morbidity mortality related to LV systolic dysfunction. #Dyslipidemia.  Elevated LDL cholesterol.  Minimal coronary atherosclerosis in the past.  Continue lifestyle modifications.  Dietary restrictions reviewed.  LDL goal of less than 70.  At rosuvastatin 10 mg started.  Risk benefits alternatives side effects reviewed.  If tolerated repeat CMP lipid panel in 6 to 8 weeks. #Abnormal EKG.  Inferior Q waves poor R wave progression.  Has been noted in the past.  No significant obstructive CAD in the past.  Asymptomatic at very high level of workload at baseline.  Continue lifestyle and risk factor modification. # Sinus tachycardia/hyperthroidism HR has been well controlled on BB On methimazole Continue routine f/u with [facility] Try to keep him in euthyroid state.  Counseling regarding low saturated fat, salt and carbohydrate intake was reviewed. Active lifestyle and regular. Exercise along with weight management is advised. All the above were at length reviewed. Answered all the questions. Thank you very much for this kind referral. Please do not hesitate to give me a call for any question. Part of this transcription was done with voice recognition software and phonetically similar errors are common. I apologize for that. Please do not hesitate to call for any questions due to above. Labs ordered.  Prior to evaluation with Dr. Reyes  Sincerely, Main Oconnell MD,[facility]

## 2024-06-11 ENCOUNTER — APPOINTMENT (OUTPATIENT)
Dept: CARDIOLOGY | Facility: CLINIC | Age: 64
End: 2024-06-11

## 2024-07-31 ENCOUNTER — APPOINTMENT (OUTPATIENT)
Dept: CARDIOLOGY | Facility: CLINIC | Age: 64
End: 2024-07-31

## 2024-08-26 ENCOUNTER — NON-APPOINTMENT (OUTPATIENT)
Age: 64
End: 2024-08-26

## 2024-08-26 ENCOUNTER — APPOINTMENT (OUTPATIENT)
Dept: CARDIOLOGY | Facility: CLINIC | Age: 64
End: 2024-08-26
Payer: COMMERCIAL

## 2024-08-26 VITALS
HEIGHT: 67 IN | OXYGEN SATURATION: 97 % | WEIGHT: 208 LBS | HEART RATE: 87 BPM | BODY MASS INDEX: 32.65 KG/M2 | DIASTOLIC BLOOD PRESSURE: 70 MMHG | SYSTOLIC BLOOD PRESSURE: 116 MMHG

## 2024-08-26 DIAGNOSIS — R94.31 ABNORMAL ELECTROCARDIOGRAM [ECG] [EKG]: ICD-10-CM

## 2024-08-26 DIAGNOSIS — R29.818 OTHER SYMPTOMS AND SIGNS INVOLVING THE NERVOUS SYSTEM: ICD-10-CM

## 2024-08-26 DIAGNOSIS — I42.8 OTHER CARDIOMYOPATHIES: ICD-10-CM

## 2024-08-26 DIAGNOSIS — I50.20 UNSPECIFIED SYSTOLIC (CONGESTIVE) HEART FAILURE: ICD-10-CM

## 2024-08-26 DIAGNOSIS — E78.5 HYPERLIPIDEMIA, UNSPECIFIED: ICD-10-CM

## 2024-08-26 PROCEDURE — 99214 OFFICE O/P EST MOD 30 MIN: CPT

## 2024-08-26 PROCEDURE — G2211 COMPLEX E/M VISIT ADD ON: CPT

## 2024-08-26 PROCEDURE — 93000 ELECTROCARDIOGRAM COMPLETE: CPT

## 2024-08-26 NOTE — HISTORY OF PRESENT ILLNESS
[FreeTextEntry1] : 62 y/o male with history of chronic systolic heart failure ACC/AHA stage C, Class 1, non-ischemic cardiomyopathy (LVEF 45% from 25% in 3/2020), FH cardiomyopathy, nonobstructive CAD and hyperthyroidism .   He denies history of HTN, HLD, DM. He was never a smoker. Denies Etoh abuse.   He reports feeling well and has never experienced symptoms related to his heart failure. He is very active without cardiopulmonary limitations.  Extensive bicycling and continue to have aggressive activity and exercise without any significant limitations.  Since last seen though he has gained weight.  He has been eating more and doing less exercise this summer. Wife has noticed increased snoring.  He denies chest discomfort, palpitations, sob, dizziness/LH, lower extremity edema, orthopnea and PND.  He has no near syncopal or syncopal event. He denies any ED visits or hospital admissions for decompensated HF.

## 2024-08-26 NOTE — ASSESSMENT
[FreeTextEntry1] : Reviewed on September 13, 2022 N-terminal proBNP 83.7  HDL 46 triglycerides 74 total cholesterol 175 creatinine 0.8 potassium 4.7 sodium 139 LFT normal CBC stable  Reviewed on September 27, 2023. EKG as noted above. Labs from September 21, 2023 sodium 139 potassium 4.6 creatinine 0.9.  N-terminal proBNP 62.  Total cholesterol 184 triglycerides 84 HDL 47 .  Reviewed on August 26, 2024. Labs from February 2024 reviewed.  CMP with mild hyperglycemia.  N-terminal proBNP around 90. EKG reviewed.

## 2024-08-26 NOTE — DISCUSSION/SUMMARY
[FreeTextEntry1] : 63-year-old gentleman with above medical history active medical problems as noted below #Chronic HFrEF (LVEF 45% 12/23) NICMP, NYHA class I, Stage C Euvolemic, extremities warm to touch GDMT: Entresto 97/103 mg BID, Carvedilol 25mg BID Diuretics: Not needed at this time Device: Not indicated  HF education provided including the natural progression of HF and necessity of continuing medications despite improvement in heart function. Low sodium diet encouraged. Informed about lert s/s including sudden weight gain, sob, change in AT.  Echocardiogram ordered for LV ejection fraction dimension in presence of history of nonischemic cardiomyopathy.  Important to follow to appropriately adjust management to decrease hospital admissions and morbidity mortality related to LV systolic dysfunction. Repeat labs ordered. Strongly recommended to control diet increasing exercise and reduced weight. #Dyslipidemia.  Elevated LDL cholesterol.  Minimal coronary atherosclerosis in the past.  Continue lifestyle modifications.  Dietary restrictions reviewed.  LDL goal of less than 70.  At rosuvastatin 10 mg started.  Risk benefits alternatives side effects reviewed.  If tolerated repeat CMP lipid panel in 6 to 8 weeks. #Abnormal EKG.  Inferior Q waves poor R wave progression.  Has been noted in the past.  No significant obstructive CAD in the past.  Asymptomatic at very high level of workload at baseline.  Continue lifestyle and risk factor modification. # Sinus tachycardia/hyperthroidism HR has been well controlled on BB On methimazole Continue routine f/u with [facility] Try to keep him in euthyroid state. # Increasing snoring.  Weight reduction recommended.  Sleep apnea evaluation in presence of cardiomyopathy reviewed.  Home sleep study ordered.  Based on that further recommendations.  Counseling regarding low saturated fat, salt and carbohydrate intake was reviewed. Active lifestyle and regular. Exercise along with weight management is advised. All the above were at length reviewed. Answered all the questions. Thank you very much for this kind referral. Please do not hesitate to give me a call for any question. Part of this transcription was done with voice recognition software and phonetically similar errors are common. I apologize for that. Please do not hesitate to call for any questions due to above. Labs ordered.  Prior to evaluation with Dr. Reyes  Sincerely, Main Oconnell MD,[facility]  [EKG obtained to assist in diagnosis and management of assessed problem(s)] : EKG obtained to assist in diagnosis and management of assessed problem(s)

## 2024-08-26 NOTE — PHYSICAL EXAM
[Normal Venous Pressure] : normal venous pressure [Normal S1, S2] : normal S1, S2 [No Rub] : no rub [No Gallop] : no gallop [Murmur] : murmur [Normal] : clear lung fields, good air entry, no respiratory distress [No Edema] : no edema [Normal Radial B/L] : normal radial B/L [Normal Speech] : normal speech [Alert and Oriented] : alert and oriented [de-identified] : warm to touch

## 2024-10-03 ENCOUNTER — NON-APPOINTMENT (OUTPATIENT)
Age: 64
End: 2024-10-03

## 2024-10-07 PROBLEM — G47.33 OBSTRUCTIVE SLEEP APNEA, ADULT: Status: ACTIVE | Noted: 2024-10-07

## 2024-10-15 ENCOUNTER — RX RENEWAL (OUTPATIENT)
Age: 64
End: 2024-10-15

## 2024-12-16 LAB — HBA1C MFR BLD HPLC: 5.4

## 2024-12-30 ENCOUNTER — APPOINTMENT (OUTPATIENT)
Dept: CARDIOLOGY | Facility: CLINIC | Age: 64
End: 2024-12-30

## 2025-01-22 ENCOUNTER — APPOINTMENT (OUTPATIENT)
Dept: GASTROENTEROLOGY | Facility: CLINIC | Age: 65
End: 2025-01-22

## 2025-02-13 ENCOUNTER — APPOINTMENT (OUTPATIENT)
Dept: CARDIOLOGY | Facility: CLINIC | Age: 65
End: 2025-02-13
Payer: COMMERCIAL

## 2025-02-13 ENCOUNTER — NON-APPOINTMENT (OUTPATIENT)
Age: 65
End: 2025-02-13

## 2025-02-13 VITALS
DIASTOLIC BLOOD PRESSURE: 70 MMHG | OXYGEN SATURATION: 99 % | HEART RATE: 79 BPM | WEIGHT: 200 LBS | BODY MASS INDEX: 31.39 KG/M2 | SYSTOLIC BLOOD PRESSURE: 110 MMHG | HEIGHT: 67 IN

## 2025-02-13 DIAGNOSIS — I50.20 UNSPECIFIED SYSTOLIC (CONGESTIVE) HEART FAILURE: ICD-10-CM

## 2025-02-13 DIAGNOSIS — E78.5 HYPERLIPIDEMIA, UNSPECIFIED: ICD-10-CM

## 2025-02-13 DIAGNOSIS — I42.9 CARDIOMYOPATHY, UNSPECIFIED: ICD-10-CM

## 2025-02-13 DIAGNOSIS — I42.8 OTHER CARDIOMYOPATHIES: ICD-10-CM

## 2025-02-13 DIAGNOSIS — R29.818 OTHER SYMPTOMS AND SIGNS INVOLVING THE NERVOUS SYSTEM: ICD-10-CM

## 2025-02-13 DIAGNOSIS — G47.33 OBSTRUCTIVE SLEEP APNEA (ADULT) (PEDIATRIC): ICD-10-CM

## 2025-02-13 PROCEDURE — G2211 COMPLEX E/M VISIT ADD ON: CPT | Mod: NC

## 2025-02-13 PROCEDURE — 93356 MYOCRD STRAIN IMG SPCKL TRCK: CPT

## 2025-02-13 PROCEDURE — 93306 TTE W/DOPPLER COMPLETE: CPT

## 2025-02-13 PROCEDURE — 99214 OFFICE O/P EST MOD 30 MIN: CPT

## 2025-02-14 ENCOUNTER — APPOINTMENT (OUTPATIENT)
Dept: CARDIOLOGY | Facility: CLINIC | Age: 65
End: 2025-02-14

## 2025-03-14 ENCOUNTER — NON-APPOINTMENT (OUTPATIENT)
Age: 65
End: 2025-03-14

## 2025-03-14 ENCOUNTER — APPOINTMENT (OUTPATIENT)
Dept: CARDIOLOGY | Facility: CLINIC | Age: 65
End: 2025-03-14
Payer: COMMERCIAL

## 2025-03-14 VITALS
HEART RATE: 75 BPM | BODY MASS INDEX: 31.08 KG/M2 | DIASTOLIC BLOOD PRESSURE: 70 MMHG | SYSTOLIC BLOOD PRESSURE: 108 MMHG | WEIGHT: 198 LBS | HEIGHT: 67 IN | OXYGEN SATURATION: 98 %

## 2025-03-14 DIAGNOSIS — I50.20 UNSPECIFIED SYSTOLIC (CONGESTIVE) HEART FAILURE: ICD-10-CM

## 2025-03-14 DIAGNOSIS — I42.8 OTHER CARDIOMYOPATHIES: ICD-10-CM

## 2025-03-14 PROCEDURE — G2211 COMPLEX E/M VISIT ADD ON: CPT | Mod: NC

## 2025-03-14 PROCEDURE — 99215 OFFICE O/P EST HI 40 MIN: CPT

## 2025-03-26 ENCOUNTER — APPOINTMENT (OUTPATIENT)
Dept: GASTROENTEROLOGY | Facility: CLINIC | Age: 65
End: 2025-03-26

## 2025-04-22 ENCOUNTER — RX RENEWAL (OUTPATIENT)
Age: 65
End: 2025-04-22

## 2025-05-14 ENCOUNTER — APPOINTMENT (OUTPATIENT)
Dept: GASTROENTEROLOGY | Facility: CLINIC | Age: 65
End: 2025-05-14
Payer: COMMERCIAL

## 2025-05-14 VITALS
DIASTOLIC BLOOD PRESSURE: 70 MMHG | SYSTOLIC BLOOD PRESSURE: 112 MMHG | HEIGHT: 69 IN | BODY MASS INDEX: 28.14 KG/M2 | WEIGHT: 190 LBS

## 2025-05-14 DIAGNOSIS — Z12.11 ENCOUNTER FOR SCREENING FOR MALIGNANT NEOPLASM OF COLON: ICD-10-CM

## 2025-05-14 PROCEDURE — 99203 OFFICE O/P NEW LOW 30 MIN: CPT

## 2025-06-04 ENCOUNTER — APPOINTMENT (OUTPATIENT)
Dept: CT IMAGING | Facility: CLINIC | Age: 65
End: 2025-06-04
Payer: COMMERCIAL

## 2025-06-04 ENCOUNTER — APPOINTMENT (OUTPATIENT)
Dept: MRI IMAGING | Facility: CLINIC | Age: 65
End: 2025-06-04
Payer: COMMERCIAL

## 2025-06-04 PROCEDURE — 73200 CT UPPER EXTREMITY W/O DYE: CPT | Mod: LT

## 2025-06-04 PROCEDURE — 73221 MRI JOINT UPR EXTREM W/O DYE: CPT | Mod: LT

## 2025-06-23 ENCOUNTER — APPOINTMENT (OUTPATIENT)
Dept: CARDIOLOGY | Facility: CLINIC | Age: 65
End: 2025-06-23

## 2025-06-25 ENCOUNTER — APPOINTMENT (OUTPATIENT)
Dept: CARDIOLOGY | Facility: CLINIC | Age: 65
End: 2025-06-25
Payer: COMMERCIAL

## 2025-06-25 VITALS
OXYGEN SATURATION: 97 % | WEIGHT: 200 LBS | HEIGHT: 69 IN | DIASTOLIC BLOOD PRESSURE: 70 MMHG | SYSTOLIC BLOOD PRESSURE: 108 MMHG | BODY MASS INDEX: 29.62 KG/M2 | HEART RATE: 86 BPM

## 2025-06-25 PROCEDURE — 93000 ELECTROCARDIOGRAM COMPLETE: CPT

## 2025-06-25 PROCEDURE — 99214 OFFICE O/P EST MOD 30 MIN: CPT

## 2025-07-09 ENCOUNTER — TRANSCRIPTION ENCOUNTER (OUTPATIENT)
Age: 65
End: 2025-07-09

## 2025-09-15 ENCOUNTER — APPOINTMENT (OUTPATIENT)
Dept: CARDIOLOGY | Facility: CLINIC | Age: 65
End: 2025-09-15